# Patient Record
Sex: MALE | Race: BLACK OR AFRICAN AMERICAN | Employment: OTHER | URBAN - METROPOLITAN AREA
[De-identification: names, ages, dates, MRNs, and addresses within clinical notes are randomized per-mention and may not be internally consistent; named-entity substitution may affect disease eponyms.]

---

## 2018-01-01 ENCOUNTER — APPOINTMENT (OUTPATIENT)
Dept: GENERAL RADIOLOGY | Facility: HOSPITAL | Age: 58
DRG: 207 | End: 2018-01-01
Attending: INTERNAL MEDICINE
Payer: MEDICARE

## 2018-01-01 ENCOUNTER — APPOINTMENT (OUTPATIENT)
Dept: GENERAL RADIOLOGY | Facility: HOSPITAL | Age: 58
DRG: 207 | End: 2018-01-01
Attending: HOSPITALIST
Payer: MEDICARE

## 2018-01-01 ENCOUNTER — APPOINTMENT (OUTPATIENT)
Dept: CT IMAGING | Facility: HOSPITAL | Age: 58
DRG: 207 | End: 2018-01-01
Attending: STUDENT IN AN ORGANIZED HEALTH CARE EDUCATION/TRAINING PROGRAM
Payer: MEDICARE

## 2018-01-01 ENCOUNTER — APPOINTMENT (OUTPATIENT)
Dept: CT IMAGING | Facility: HOSPITAL | Age: 58
DRG: 166 | End: 2018-01-01
Attending: INTERNAL MEDICINE
Payer: MEDICARE

## 2018-01-01 ENCOUNTER — APPOINTMENT (OUTPATIENT)
Dept: ULTRASOUND IMAGING | Facility: HOSPITAL | Age: 58
DRG: 207 | End: 2018-01-01
Attending: INTERNAL MEDICINE
Payer: MEDICARE

## 2018-01-01 ENCOUNTER — HOSPITAL ENCOUNTER (INPATIENT)
Facility: HOSPITAL | Age: 58
LOS: 11 days | Discharge: NURSING, REHAB OR LONG-TERM CARE | DRG: 207 | End: 2018-01-01
Attending: STUDENT IN AN ORGANIZED HEALTH CARE EDUCATION/TRAINING PROGRAM | Admitting: HOSPITALIST
Payer: MEDICARE

## 2018-01-01 ENCOUNTER — APPOINTMENT (OUTPATIENT)
Dept: CT IMAGING | Facility: HOSPITAL | Age: 58
DRG: 166 | End: 2018-01-01
Attending: EMERGENCY MEDICINE
Payer: MEDICARE

## 2018-01-01 ENCOUNTER — HOSPITAL ENCOUNTER (INPATIENT)
Facility: HOSPITAL | Age: 58
LOS: 1 days | DRG: 177 | End: 2018-01-01
Attending: HOSPITALIST | Admitting: HOSPITALIST
Payer: OTHER MISCELLANEOUS

## 2018-01-01 ENCOUNTER — APPOINTMENT (OUTPATIENT)
Dept: INTERVENTIONAL RADIOLOGY/VASCULAR | Facility: HOSPITAL | Age: 58
DRG: 166 | End: 2018-01-01
Attending: INTERNAL MEDICINE
Payer: MEDICARE

## 2018-01-01 ENCOUNTER — APPOINTMENT (OUTPATIENT)
Dept: GENERAL RADIOLOGY | Facility: HOSPITAL | Age: 58
DRG: 166 | End: 2018-01-01
Attending: INTERNAL MEDICINE
Payer: MEDICARE

## 2018-01-01 ENCOUNTER — APPOINTMENT (OUTPATIENT)
Dept: MRI IMAGING | Facility: HOSPITAL | Age: 58
DRG: 166 | End: 2018-01-01
Attending: Other
Payer: MEDICARE

## 2018-01-01 ENCOUNTER — APPOINTMENT (OUTPATIENT)
Dept: ULTRASOUND IMAGING | Facility: HOSPITAL | Age: 58
DRG: 166 | End: 2018-01-01
Attending: INTERNAL MEDICINE
Payer: MEDICARE

## 2018-01-01 ENCOUNTER — HOSPITAL ENCOUNTER (EMERGENCY)
Facility: HOSPITAL | Age: 58
Discharge: HOME | End: 2018-01-01
Attending: EMERGENCY MEDICINE
Payer: MEDICARE

## 2018-01-01 ENCOUNTER — APPOINTMENT (OUTPATIENT)
Dept: GENERAL RADIOLOGY | Facility: HOSPITAL | Age: 58
DRG: 207 | End: 2018-01-01
Attending: STUDENT IN AN ORGANIZED HEALTH CARE EDUCATION/TRAINING PROGRAM
Payer: MEDICARE

## 2018-01-01 ENCOUNTER — APPOINTMENT (OUTPATIENT)
Dept: GENERAL RADIOLOGY | Facility: HOSPITAL | Age: 58
DRG: 166 | End: 2018-01-01
Attending: HOSPITALIST
Payer: MEDICARE

## 2018-01-01 ENCOUNTER — APPOINTMENT (OUTPATIENT)
Dept: GENERAL RADIOLOGY | Facility: HOSPITAL | Age: 58
DRG: 166 | End: 2018-01-01
Attending: EMERGENCY MEDICINE
Payer: MEDICARE

## 2018-01-01 ENCOUNTER — APPOINTMENT (OUTPATIENT)
Dept: GENERAL RADIOLOGY | Facility: HOSPITAL | Age: 58
DRG: 166 | End: 2018-01-01
Attending: NURSE PRACTITIONER
Payer: MEDICARE

## 2018-01-01 ENCOUNTER — HOSPITAL ENCOUNTER (INPATIENT)
Facility: HOSPITAL | Age: 58
LOS: 12 days | Discharge: HOSPICE | DRG: 166 | End: 2018-01-01
Attending: EMERGENCY MEDICINE | Admitting: INTERNAL MEDICINE
Payer: MEDICARE

## 2018-01-01 ENCOUNTER — APPOINTMENT (OUTPATIENT)
Dept: CT IMAGING | Facility: HOSPITAL | Age: 58
DRG: 207 | End: 2018-01-01
Attending: INTERNAL MEDICINE
Payer: MEDICARE

## 2018-01-01 ENCOUNTER — LAB REQUISITION (OUTPATIENT)
Dept: LAB | Facility: HOSPITAL | Age: 58
End: 2018-01-01
Attending: INTERNAL MEDICINE
Payer: MEDICARE

## 2018-01-01 ENCOUNTER — APPOINTMENT (OUTPATIENT)
Dept: CV DIAGNOSTICS | Facility: HOSPITAL | Age: 58
DRG: 207 | End: 2018-01-01
Attending: HOSPITALIST
Payer: MEDICARE

## 2018-01-01 VITALS
HEIGHT: 68 IN | SYSTOLIC BLOOD PRESSURE: 134 MMHG | RESPIRATION RATE: 17 BRPM | BODY MASS INDEX: 20.92 KG/M2 | TEMPERATURE: 99 F | WEIGHT: 138 LBS | DIASTOLIC BLOOD PRESSURE: 62 MMHG | OXYGEN SATURATION: 100 % | HEART RATE: 66 BPM

## 2018-01-01 VITALS
BODY MASS INDEX: 17.45 KG/M2 | SYSTOLIC BLOOD PRESSURE: 126 MMHG | HEIGHT: 74 IN | OXYGEN SATURATION: 96 % | DIASTOLIC BLOOD PRESSURE: 76 MMHG | HEART RATE: 89 BPM | TEMPERATURE: 98 F | WEIGHT: 136 LBS | RESPIRATION RATE: 19 BRPM

## 2018-01-01 VITALS
HEIGHT: 74 IN | WEIGHT: 158.75 LBS | OXYGEN SATURATION: 90 % | HEART RATE: 92 BPM | BODY MASS INDEX: 20.37 KG/M2 | RESPIRATION RATE: 19 BRPM | DIASTOLIC BLOOD PRESSURE: 75 MMHG | TEMPERATURE: 98 F | SYSTOLIC BLOOD PRESSURE: 142 MMHG

## 2018-01-01 VITALS
OXYGEN SATURATION: 90 % | TEMPERATURE: 96 F | DIASTOLIC BLOOD PRESSURE: 50 MMHG | WEIGHT: 138.5 LBS | RESPIRATION RATE: 10 BRPM | HEART RATE: 60 BPM | SYSTOLIC BLOOD PRESSURE: 81 MMHG | BODY MASS INDEX: 21 KG/M2

## 2018-01-01 DIAGNOSIS — J44.9 CHRONIC OBSTRUCTIVE PULMONARY DISEASE, UNSPECIFIED COPD TYPE (HCC): ICD-10-CM

## 2018-01-01 DIAGNOSIS — I82.622 DEEP VEIN THROMBOSIS (DVT) OF LEFT UPPER EXTREMITY, UNSPECIFIED CHRONICITY, UNSPECIFIED VEIN (HCC): ICD-10-CM

## 2018-01-01 DIAGNOSIS — I51.9 HEART DISEASE: ICD-10-CM

## 2018-01-01 DIAGNOSIS — R94.31 ABNORMAL EKG: ICD-10-CM

## 2018-01-01 DIAGNOSIS — N18.9 CHRONIC RENAL FAILURE, UNSPECIFIED CKD STAGE: ICD-10-CM

## 2018-01-01 DIAGNOSIS — F32.89 OTHER SPECIFIED DEPRESSIVE EPISODES: ICD-10-CM

## 2018-01-01 DIAGNOSIS — R77.8 ELEVATED TROPONIN: ICD-10-CM

## 2018-01-01 DIAGNOSIS — S09.90XA INJURY OF HEAD, INITIAL ENCOUNTER: ICD-10-CM

## 2018-01-01 DIAGNOSIS — J18.9 PNEUMONIA OF BOTH UPPER LOBES DUE TO INFECTIOUS ORGANISM: ICD-10-CM

## 2018-01-01 DIAGNOSIS — T82.848A PAIN FROM A/V FISTULA (HCC): Primary | ICD-10-CM

## 2018-01-01 DIAGNOSIS — G93.40 ENCEPHALOPATHY ACUTE: Primary | ICD-10-CM

## 2018-01-01 DIAGNOSIS — E16.2 HYPOGLYCEMIA: ICD-10-CM

## 2018-01-01 DIAGNOSIS — I50.9 ACUTE ON CHRONIC CONGESTIVE HEART FAILURE, UNSPECIFIED HEART FAILURE TYPE (HCC): ICD-10-CM

## 2018-01-01 DIAGNOSIS — R40.0 SOMNOLENCE: ICD-10-CM
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
## 2018-01-01 LAB
ALBUMIN SERPL-MCNC: 2.3 G/DL (ref 3.5–4.8)
ALBUMIN SERPL-MCNC: 2.9 G/DL (ref 3.5–4.8)
ALBUMIN SERPL-MCNC: 3 G/DL (ref 3.5–4.8)
ALBUMIN/GLOB SERPL: 0.7 {RATIO} (ref 1–2)
ALBUMIN/GLOB SERPL: 0.8 {RATIO} (ref 1–2)
ALBUMIN/GLOB SERPL: 0.9 {RATIO} (ref 1–2)
ALLENS TEST: POSITIVE
ALP LIVER SERPL-CCNC: 100 U/L (ref 45–117)
ALP LIVER SERPL-CCNC: 220 U/L (ref 45–117)
ALP LIVER SERPL-CCNC: 92 U/L (ref 45–117)
ALT SERPL-CCNC: 15 U/L (ref 17–63)
ALT SERPL-CCNC: 26 U/L (ref 17–63)
ALT SERPL-CCNC: 55 U/L (ref 17–63)
ANION GAP SERPL CALC-SCNC: 10 MMOL/L (ref 0–18)
ANION GAP SERPL CALC-SCNC: 11 MMOL/L (ref 0–18)
ANION GAP SERPL CALC-SCNC: 11 MMOL/L (ref 0–18)
ANION GAP SERPL CALC-SCNC: 12 MMOL/L (ref 0–18)
ANION GAP SERPL CALC-SCNC: 12 MMOL/L (ref 0–18)
ANION GAP SERPL CALC-SCNC: 13 MMOL/L (ref 0–18)
ANION GAP SERPL CALC-SCNC: 15 MMOL/L (ref 0–18)
ANION GAP SERPL CALC-SCNC: 15 MMOL/L (ref 0–18)
ANION GAP SERPL CALC-SCNC: 16 MMOL/L (ref 0–18)
ANION GAP SERPL CALC-SCNC: 9 MMOL/L (ref 0–18)
APTT PPP: 131.5 SECONDS (ref 26.1–34.6)
APTT PPP: 28.7 SECONDS (ref 26.1–34.6)
APTT PPP: 31.4 SECONDS (ref 26.1–34.6)
APTT PPP: 37.5 SECONDS (ref 26.1–34.6)
APTT PPP: 47.3 SECONDS (ref 26.1–34.6)
APTT PPP: 56.2 SECONDS (ref 26.1–34.6)
APTT PPP: 60.2 SECONDS (ref 26.1–34.6)
APTT PPP: 64.3 SECONDS (ref 26.1–34.6)
APTT PPP: 67.2 SECONDS (ref 26.1–34.6)
APTT PPP: 69.7 SECONDS (ref 26.1–34.6)
APTT PPP: 73.5 SECONDS (ref 26.1–34.6)
APTT PPP: 76.8 SECONDS (ref 26.1–34.6)
APTT PPP: 79.5 SECONDS (ref 26.1–34.6)
APTT PPP: 91.5 SECONDS (ref 26.1–34.6)
APTT PPP: 97.4 SECONDS (ref 26.1–34.6)
ARTERIAL BLD GAS O2 SATURATION: 72 % (ref 92–100)
ARTERIAL BLD GAS O2 SATURATION: 96 % (ref 92–100)
ARTERIAL BLD GAS O2 SATURATION: 98 % (ref 92–100)
ARTERIAL BLOOD GAS BASE EXCESS: -5.5
ARTERIAL BLOOD GAS BASE EXCESS: -6.4
ARTERIAL BLOOD GAS BASE EXCESS: -6.5
ARTERIAL BLOOD GAS HCO3: 20.5 MEQ/L (ref 22–26)
ARTERIAL BLOOD GAS HCO3: 21.3 MEQ/L (ref 22–26)
ARTERIAL BLOOD GAS HCO3: 24.8 MEQ/L (ref 22–26)
ARTERIAL BLOOD GAS PCO2: 42 MM HG (ref 35–45)
ARTERIAL BLOOD GAS PCO2: 51 MM HG (ref 35–45)
ARTERIAL BLOOD GAS PCO2: 90 MM HG (ref 35–45)
ARTERIAL BLOOD GAS PH: 7.06 (ref 7.35–7.45)
ARTERIAL BLOOD GAS PH: 7.23 (ref 7.35–7.45)
ARTERIAL BLOOD GAS PH: 7.31 (ref 7.35–7.45)
ARTERIAL BLOOD GAS PO2: 114 MM HG (ref 80–105)
ARTERIAL BLOOD GAS PO2: 284 MM HG (ref 80–105)
ARTERIAL BLOOD GAS PO2: 55 MM HG (ref 80–105)
AST SERPL-CCNC: 14 U/L (ref 15–41)
AST SERPL-CCNC: 17 U/L (ref 15–41)
AST SERPL-CCNC: 34 U/L (ref 15–41)
ATRIAL RATE: 100 BPM
ATRIAL RATE: 101 BPM
ATRIAL RATE: 79 BPM
ATRIAL RATE: 79 BPM
ATRIAL RATE: 83 BPM
BASOPHILS # BLD AUTO: 0 X10(3) UL (ref 0–0.1)
BASOPHILS # BLD AUTO: 0.01 X10(3) UL (ref 0–0.1)
BASOPHILS # BLD AUTO: 0.03 X10(3) UL (ref 0–0.1)
BASOPHILS NFR BLD AUTO: 0 %
BASOPHILS NFR BLD AUTO: 0.1 %
BASOPHILS NFR BLD AUTO: 0.6 %
BILIRUB SERPL-MCNC: 0.3 MG/DL (ref 0.1–2)
BILIRUB SERPL-MCNC: 0.3 MG/DL (ref 0.1–2)
BILIRUB SERPL-MCNC: 0.5 MG/DL (ref 0.1–2)
BUN BLD-MCNC: 129 MG/DL (ref 8–20)
BUN BLD-MCNC: 27 MG/DL (ref 8–20)
BUN BLD-MCNC: 34 MG/DL (ref 8–20)
BUN BLD-MCNC: 36 MG/DL (ref 8–20)
BUN BLD-MCNC: 42 MG/DL (ref 8–20)
BUN BLD-MCNC: 44 MG/DL (ref 8–20)
BUN BLD-MCNC: 56 MG/DL (ref 8–20)
BUN BLD-MCNC: 81 MG/DL (ref 8–20)
BUN BLD-MCNC: 83 MG/DL (ref 8–20)
BUN BLD-MCNC: 85 MG/DL (ref 8–20)
BUN BLD-MCNC: 91 MG/DL (ref 8–20)
BUN BLD-MCNC: 92 MG/DL (ref 8–20)
BUN/CREAT SERPL: 10.8 (ref 10–20)
BUN/CREAT SERPL: 11 (ref 10–20)
BUN/CREAT SERPL: 11 (ref 10–20)
BUN/CREAT SERPL: 14.3 (ref 10–20)
BUN/CREAT SERPL: 6.8 (ref 10–20)
BUN/CREAT SERPL: 7.3 (ref 10–20)
BUN/CREAT SERPL: 7.5 (ref 10–20)
BUN/CREAT SERPL: 7.5 (ref 10–20)
BUN/CREAT SERPL: 7.8 (ref 10–20)
BUN/CREAT SERPL: 8.9 (ref 10–20)
CALCIUM BLD-MCNC: 8.8 MG/DL (ref 8.3–10.3)
CALCIUM BLD-MCNC: 8.8 MG/DL (ref 8.3–10.3)
CALCIUM BLD-MCNC: 8.9 MG/DL (ref 8.3–10.3)
CALCIUM BLD-MCNC: 9 MG/DL (ref 8.3–10.3)
CALCIUM BLD-MCNC: 9 MG/DL (ref 8.3–10.3)
CALCIUM BLD-MCNC: 9.1 MG/DL (ref 8.3–10.3)
CALCIUM BLD-MCNC: 9.3 MG/DL (ref 8.3–10.3)
CALCULATED O2 SATURATION: 100 % (ref 92–100)
CALCULATED O2 SATURATION: 71 % (ref 92–100)
CALCULATED O2 SATURATION: 98 % (ref 92–100)
CARBOXYHEMOGLOBIN: 1.3 % SAT (ref 0–3)
CARBOXYHEMOGLOBIN: 1.5 % SAT (ref 0–3)
CARBOXYHEMOGLOBIN: 1.6 % SAT (ref 0–3)
CHLORIDE SERPL-SCNC: 100 MMOL/L (ref 101–111)
CHLORIDE SERPL-SCNC: 100 MMOL/L (ref 101–111)
CHLORIDE SERPL-SCNC: 101 MMOL/L (ref 101–111)
CHLORIDE SERPL-SCNC: 102 MMOL/L (ref 101–111)
CHLORIDE SERPL-SCNC: 102 MMOL/L (ref 101–111)
CHLORIDE SERPL-SCNC: 103 MMOL/L (ref 101–111)
CHLORIDE SERPL-SCNC: 103 MMOL/L (ref 101–111)
CHLORIDE SERPL-SCNC: 104 MMOL/L (ref 101–111)
CHLORIDE SERPL-SCNC: 105 MMOL/L (ref 101–111)
CHLORIDE SERPL-SCNC: 99 MMOL/L (ref 101–111)
CHOLEST SMN-MCNC: 120 MG/DL (ref ?–200)
CO2 SERPL-SCNC: 21 MMOL/L (ref 22–32)
CO2 SERPL-SCNC: 22 MMOL/L (ref 22–32)
CO2 SERPL-SCNC: 22 MMOL/L (ref 22–32)
CO2 SERPL-SCNC: 23 MMOL/L (ref 22–32)
CO2 SERPL-SCNC: 23 MMOL/L (ref 22–32)
CO2 SERPL-SCNC: 24 MMOL/L (ref 22–32)
CO2 SERPL-SCNC: 25 MMOL/L (ref 22–32)
CO2 SERPL-SCNC: 26 MMOL/L (ref 22–32)
CO2 SERPL-SCNC: 26 MMOL/L (ref 22–32)
CO2 SERPL-SCNC: 27 MMOL/L (ref 22–32)
CO2 SERPL-SCNC: 28 MMOL/L (ref 22–32)
CO2 SERPL-SCNC: 28 MMOL/L (ref 22–32)
CORTIS SERPL-MCNC: 21.4 UG/DL
CREAT BLD-MCNC: 3.99 MG/DL (ref 0.7–1.3)
CREAT BLD-MCNC: 4.67 MG/DL (ref 0.7–1.3)
CREAT BLD-MCNC: 4.78 MG/DL (ref 0.7–1.3)
CREAT BLD-MCNC: 5.58 MG/DL (ref 0.7–1.3)
CREAT BLD-MCNC: 5.62 MG/DL (ref 0.7–1.3)
CREAT BLD-MCNC: 6.29 MG/DL (ref 0.7–1.3)
CREAT BLD-MCNC: 7.35 MG/DL (ref 0.7–1.3)
CREAT BLD-MCNC: 7.69 MG/DL (ref 0.7–1.3)
CREAT BLD-MCNC: 7.9 MG/DL (ref 0.7–1.3)
CREAT BLD-MCNC: 8.34 MG/DL (ref 0.7–1.3)
CREAT BLD-MCNC: 8.42 MG/DL (ref 0.7–1.3)
CREAT BLD-MCNC: 9.04 MG/DL (ref 0.7–1.3)
EOSINOPHIL # BLD AUTO: 0 X10(3) UL (ref 0–0.3)
EOSINOPHIL # BLD AUTO: 0 X10(3) UL (ref 0–0.3)
EOSINOPHIL # BLD AUTO: 0.01 X10(3) UL (ref 0–0.3)
EOSINOPHIL # BLD AUTO: 0.03 X10(3) UL (ref 0–0.3)
EOSINOPHIL # BLD AUTO: 0.03 X10(3) UL (ref 0–0.3)
EOSINOPHIL # BLD AUTO: 0.08 X10(3) UL (ref 0–0.3)
EOSINOPHIL # BLD AUTO: 0.1 X10(3) UL (ref 0–0.3)
EOSINOPHIL # BLD AUTO: 0.11 X10(3) UL (ref 0–0.3)
EOSINOPHIL # BLD AUTO: 0.14 X10(3) UL (ref 0–0.3)
EOSINOPHIL NFR BLD AUTO: 0 %
EOSINOPHIL NFR BLD AUTO: 0 %
EOSINOPHIL NFR BLD AUTO: 0.1 %
EOSINOPHIL NFR BLD AUTO: 0.2 %
EOSINOPHIL NFR BLD AUTO: 0.2 %
EOSINOPHIL NFR BLD AUTO: 1 %
EOSINOPHIL NFR BLD AUTO: 1.3 %
EOSINOPHIL NFR BLD AUTO: 1.5 %
EOSINOPHIL NFR BLD AUTO: 2.1 %
ERYTHROCYTE [DISTWIDTH] IN BLOOD BY AUTOMATED COUNT: 16.6 % (ref 11.5–16)
ERYTHROCYTE [DISTWIDTH] IN BLOOD BY AUTOMATED COUNT: 17 % (ref 11.5–16)
ERYTHROCYTE [DISTWIDTH] IN BLOOD BY AUTOMATED COUNT: 17.6 % (ref 11.5–16)
ERYTHROCYTE [DISTWIDTH] IN BLOOD BY AUTOMATED COUNT: 17.7 % (ref 11.5–16)
ERYTHROCYTE [DISTWIDTH] IN BLOOD BY AUTOMATED COUNT: 17.8 % (ref 11.5–16)
ERYTHROCYTE [DISTWIDTH] IN BLOOD BY AUTOMATED COUNT: 17.9 % (ref 11.5–16)
ERYTHROCYTE [DISTWIDTH] IN BLOOD BY AUTOMATED COUNT: 18 % (ref 11.5–16)
ERYTHROCYTE [DISTWIDTH] IN BLOOD BY AUTOMATED COUNT: 18.1 % (ref 11.5–16)
ERYTHROCYTE [DISTWIDTH] IN BLOOD BY AUTOMATED COUNT: 18.3 % (ref 11.5–16)
ERYTHROCYTE [DISTWIDTH] IN BLOOD BY AUTOMATED COUNT: 18.4 % (ref 11.5–16)
ERYTHROCYTE [DISTWIDTH] IN BLOOD BY AUTOMATED COUNT: 18.4 % (ref 11.5–16)
ERYTHROCYTE [DISTWIDTH] IN BLOOD BY AUTOMATED COUNT: 18.6 % (ref 11.5–16)
FIBRINOGEN: 634 MG/DL (ref 200–446)
FIO2: 100 %
FIO2: 60 %
GLOBULIN PLAS-MCNC: 3.3 G/DL (ref 2.5–4)
GLOBULIN PLAS-MCNC: 3.3 G/DL (ref 2.5–4)
GLOBULIN PLAS-MCNC: 4 G/DL (ref 2.5–3.7)
GLUCOSE BLD-MCNC: 100 MG/DL (ref 65–99)
GLUCOSE BLD-MCNC: 101 MG/DL (ref 65–99)
GLUCOSE BLD-MCNC: 102 MG/DL (ref 65–99)
GLUCOSE BLD-MCNC: 103 MG/DL (ref 65–99)
GLUCOSE BLD-MCNC: 110 MG/DL (ref 70–99)
GLUCOSE BLD-MCNC: 111 MG/DL (ref 65–99)
GLUCOSE BLD-MCNC: 116 MG/DL (ref 65–99)
GLUCOSE BLD-MCNC: 129 MG/DL (ref 70–99)
GLUCOSE BLD-MCNC: 136 MG/DL (ref 70–99)
GLUCOSE BLD-MCNC: 137 MG/DL (ref 65–99)
GLUCOSE BLD-MCNC: 142 MG/DL (ref 65–99)
GLUCOSE BLD-MCNC: 146 MG/DL (ref 65–99)
GLUCOSE BLD-MCNC: 160 MG/DL (ref 65–99)
GLUCOSE BLD-MCNC: 56 MG/DL (ref 65–99)
GLUCOSE BLD-MCNC: 70 MG/DL (ref 65–99)
GLUCOSE BLD-MCNC: 75 MG/DL (ref 65–99)
GLUCOSE BLD-MCNC: 78 MG/DL (ref 65–99)
GLUCOSE BLD-MCNC: 80 MG/DL (ref 65–99)
GLUCOSE BLD-MCNC: 80 MG/DL (ref 65–99)
GLUCOSE BLD-MCNC: 80 MG/DL (ref 70–99)
GLUCOSE BLD-MCNC: 80 MG/DL (ref 70–99)
GLUCOSE BLD-MCNC: 83 MG/DL (ref 70–99)
GLUCOSE BLD-MCNC: 84 MG/DL (ref 70–99)
GLUCOSE BLD-MCNC: 84 MG/DL (ref 70–99)
GLUCOSE BLD-MCNC: 85 MG/DL (ref 65–99)
GLUCOSE BLD-MCNC: 85 MG/DL (ref 65–99)
GLUCOSE BLD-MCNC: 85 MG/DL (ref 70–99)
GLUCOSE BLD-MCNC: 86 MG/DL (ref 65–99)
GLUCOSE BLD-MCNC: 86 MG/DL (ref 70–99)
GLUCOSE BLD-MCNC: 87 MG/DL (ref 65–99)
GLUCOSE BLD-MCNC: 87 MG/DL (ref 70–99)
GLUCOSE BLD-MCNC: 91 MG/DL (ref 65–99)
GLUCOSE BLD-MCNC: 92 MG/DL (ref 65–99)
GLUCOSE BLD-MCNC: 92 MG/DL (ref 70–99)
GLUCOSE BLD-MCNC: 93 MG/DL (ref 65–99)
GLUCOSE BLD-MCNC: 97 MG/DL (ref 65–99)
HAV IGM SER QL: 2.1 MG/DL (ref 1.8–2.5)
HAV IGM SER QL: 2.2 MG/DL (ref 1.8–2.5)
HAV IGM SER QL: 2.3 MG/DL (ref 1.8–2.5)
HAV IGM SER QL: 2.5 MG/DL (ref 1.8–2.5)
HAV IGM SER QL: 2.8 MG/DL (ref 1.8–2.5)
HBV SURFACE AG SER-ACNC: <0.1 [IU]/L
HBV SURFACE AG SERPL QL IA: NONREACTIVE
HCT VFR BLD AUTO: 27.9 % (ref 37–53)
HCT VFR BLD AUTO: 28 % (ref 37–53)
HCT VFR BLD AUTO: 28 % (ref 37–53)
HCT VFR BLD AUTO: 28.3 % (ref 37–53)
HCT VFR BLD AUTO: 29 % (ref 37–53)
HCT VFR BLD AUTO: 29.1 % (ref 37–53)
HCT VFR BLD AUTO: 29.4 % (ref 37–53)
HCT VFR BLD AUTO: 30.1 % (ref 37–53)
HCT VFR BLD AUTO: 30.2 % (ref 37–53)
HCT VFR BLD AUTO: 31.4 % (ref 37–53)
HCT VFR BLD AUTO: 31.8 % (ref 37–53)
HCT VFR BLD AUTO: 32.2 % (ref 37–53)
HDLC SERPL-MCNC: 60 MG/DL (ref 40–59)
HGB BLD-MCNC: 8.6 G/DL (ref 13–17)
HGB BLD-MCNC: 8.8 G/DL (ref 13–17)
HGB BLD-MCNC: 8.8 G/DL (ref 13–17)
HGB BLD-MCNC: 9 G/DL (ref 13–17)
HGB BLD-MCNC: 9.1 G/DL (ref 13–17)
HGB BLD-MCNC: 9.2 G/DL (ref 13–17)
HGB BLD-MCNC: 9.2 G/DL (ref 13–17)
HGB BLD-MCNC: 9.3 G/DL (ref 13–17)
HGB BLD-MCNC: 9.5 G/DL (ref 13–17)
HGB BLD-MCNC: 9.7 G/DL (ref 13–17)
IMMATURE GRANULOCYTE COUNT: 0.02 X10(3) UL (ref 0–1)
IMMATURE GRANULOCYTE COUNT: 0.03 X10(3) UL (ref 0–1)
IMMATURE GRANULOCYTE COUNT: 0.03 X10(3) UL (ref 0–1)
IMMATURE GRANULOCYTE COUNT: 0.05 X10(3) UL (ref 0–1)
IMMATURE GRANULOCYTE COUNT: 0.06 X10(3) UL (ref 0–1)
IMMATURE GRANULOCYTE COUNT: 0.08 X10(3) UL (ref 0–1)
IMMATURE GRANULOCYTE COUNT: 0.12 X10(3) UL (ref 0–1)
IMMATURE GRANULOCYTE COUNT: 0.16 X10(3) UL (ref 0–1)
IMMATURE GRANULOCYTE COUNT: 0.17 X10(3) UL (ref 0–1)
IMMATURE GRANULOCYTE RATIO %: 0.4 %
IMMATURE GRANULOCYTE RATIO %: 0.4 %
IMMATURE GRANULOCYTE RATIO %: 0.6 %
IMMATURE GRANULOCYTE RATIO %: 1 %
IMMATURE GRANULOCYTE RATIO %: 1.2 %
IMMATURE GRANULOCYTE RATIO %: 1.3 %
INR BLD: 1.1 (ref 0.9–1.1)
INR BLD: 1.3 (ref 0.9–1.1)
INR BLD: 1.7 (ref 0.9–1.1)
INR BLD: 1.88 (ref 0.9–1.1)
INR BLD: 2.25 (ref 0.9–1.1)
INR BLD: 2.72 (ref 0.9–1.1)
INR BLD: 3.15 (ref 0.9–1.1)
INR BLD: 3.9 (ref 0.9–1.1)
INR BLD: 4.33 (ref 0.9–1.1)
INR BLD: 5.39 (ref 0.9–1.1)
INR BLD: 7.41 (ref 0.9–1.1)
IONIZED CALCIUM: 1.15 MMOL/L (ref 1.12–1.32)
IONIZED CALCIUM: 1.24 MMOL/L (ref 1.12–1.32)
IONIZED CALCIUM: 1.3 MMOL/L (ref 1.12–1.32)
LACTIC ACID ARTERIAL: 2.4 MMOL/L (ref 0.5–2)
LACTIC ACID ARTERIAL: <1.3 MMOL/L (ref 0.5–2)
LACTIC ACID ARTERIAL: <1.3 MMOL/L (ref 0.5–2)
LDLC SERPL CALC-MCNC: 20 MG/DL (ref ?–100)
LYMPHOCYTES # BLD AUTO: 0.23 X10(3) UL (ref 0.9–4)
LYMPHOCYTES # BLD AUTO: 0.34 X10(3) UL (ref 0.9–4)
LYMPHOCYTES # BLD AUTO: 0.36 X10(3) UL (ref 0.9–4)
LYMPHOCYTES # BLD AUTO: 0.39 X10(3) UL (ref 0.9–4)
LYMPHOCYTES # BLD AUTO: 0.47 X10(3) UL (ref 0.9–4)
LYMPHOCYTES # BLD AUTO: 0.59 X10(3) UL (ref 0.9–4)
LYMPHOCYTES # BLD AUTO: 0.6 X10(3) UL (ref 0.9–4)
LYMPHOCYTES # BLD AUTO: 0.67 X10(3) UL (ref 0.9–4)
LYMPHOCYTES # BLD AUTO: 0.68 X10(3) UL (ref 0.9–4)
LYMPHOCYTES NFR BLD AUTO: 1.4 %
LYMPHOCYTES NFR BLD AUTO: 14.3 %
LYMPHOCYTES NFR BLD AUTO: 3.6 %
LYMPHOCYTES NFR BLD AUTO: 3.7 %
LYMPHOCYTES NFR BLD AUTO: 4.2 %
LYMPHOCYTES NFR BLD AUTO: 4.7 %
LYMPHOCYTES NFR BLD AUTO: 6 %
LYMPHOCYTES NFR BLD AUTO: 7.8 %
LYMPHOCYTES NFR BLD AUTO: 9.2 %
M PROTEIN MFR SERPL ELPH: 5.6 G/DL (ref 6.1–8.3)
M PROTEIN MFR SERPL ELPH: 6.2 G/DL (ref 6.1–8.3)
M PROTEIN MFR SERPL ELPH: 7 G/DL (ref 6.1–8.3)
MCH RBC QN AUTO: 29.7 PG (ref 27–33.2)
MCH RBC QN AUTO: 29.7 PG (ref 27–33.2)
MCH RBC QN AUTO: 29.9 PG (ref 27–33.2)
MCH RBC QN AUTO: 30.2 PG (ref 27–33.2)
MCH RBC QN AUTO: 30.2 PG (ref 27–33.2)
MCH RBC QN AUTO: 30.4 PG (ref 27–33.2)
MCH RBC QN AUTO: 30.7 PG (ref 27–33.2)
MCH RBC QN AUTO: 30.7 PG (ref 27–33.2)
MCH RBC QN AUTO: 30.8 PG (ref 27–33.2)
MCH RBC QN AUTO: 30.8 PG (ref 27–33.2)
MCH RBC QN AUTO: 30.9 PG (ref 27–33.2)
MCH RBC QN AUTO: 30.9 PG (ref 27–33.2)
MCHC RBC AUTO-ENTMCNC: 29.9 G/DL (ref 31–37)
MCHC RBC AUTO-ENTMCNC: 30.1 G/DL (ref 31–37)
MCHC RBC AUTO-ENTMCNC: 30.1 G/DL (ref 31–37)
MCHC RBC AUTO-ENTMCNC: 30.3 G/DL (ref 31–37)
MCHC RBC AUTO-ENTMCNC: 30.3 G/DL (ref 31–37)
MCHC RBC AUTO-ENTMCNC: 30.4 G/DL (ref 31–37)
MCHC RBC AUTO-ENTMCNC: 30.9 G/DL (ref 31–37)
MCHC RBC AUTO-ENTMCNC: 31.3 G/DL (ref 31–37)
MCHC RBC AUTO-ENTMCNC: 31.5 G/DL (ref 31–37)
MCHC RBC AUTO-ENTMCNC: 32.1 G/DL (ref 31–37)
MCHC RBC AUTO-ENTMCNC: 32.6 G/DL (ref 31–37)
MCHC RBC AUTO-ENTMCNC: 32.9 G/DL (ref 31–37)
MCV RBC AUTO: 101.9 FL (ref 80–99)
MCV RBC AUTO: 103.2 FL (ref 80–99)
MCV RBC AUTO: 93 FL (ref 80–99)
MCV RBC AUTO: 94 FL (ref 80–99)
MCV RBC AUTO: 94.2 FL (ref 80–99)
MCV RBC AUTO: 96.5 FL (ref 80–99)
MCV RBC AUTO: 98 FL (ref 80–99)
MCV RBC AUTO: 98.7 FL (ref 80–99)
MCV RBC AUTO: 98.7 FL (ref 80–99)
MCV RBC AUTO: 99.3 FL (ref 80–99)
MCV RBC AUTO: 99.7 FL (ref 80–99)
MCV RBC AUTO: 99.7 FL (ref 80–99)
METHEMOGLOBIN: 0.5 % SAT (ref 0.4–1.5)
METHEMOGLOBIN: 0.5 % SAT (ref 0.4–1.5)
METHEMOGLOBIN: 0.7 % SAT (ref 0.4–1.5)
MONOCYTES # BLD AUTO: 0.14 X10(3) UL (ref 0.1–1)
MONOCYTES # BLD AUTO: 0.3 X10(3) UL (ref 0.1–1)
MONOCYTES # BLD AUTO: 0.39 X10(3) UL (ref 0.1–1)
MONOCYTES # BLD AUTO: 0.57 X10(3) UL (ref 0.1–1)
MONOCYTES # BLD AUTO: 0.57 X10(3) UL (ref 0.1–1)
MONOCYTES # BLD AUTO: 0.69 X10(3) UL (ref 0.1–1)
MONOCYTES # BLD AUTO: 0.75 X10(3) UL (ref 0.1–1)
MONOCYTES # BLD AUTO: 0.76 X10(3) UL (ref 0.1–1)
MONOCYTES # BLD AUTO: 0.9 X10(3) UL (ref 0.1–1)
MONOCYTES NFR BLD AUTO: 1.5 %
MONOCYTES NFR BLD AUTO: 5.4 %
MONOCYTES NFR BLD AUTO: 5.4 %
MONOCYTES NFR BLD AUTO: 5.7 %
MONOCYTES NFR BLD AUTO: 5.9 %
MONOCYTES NFR BLD AUTO: 6.5 %
MONOCYTES NFR BLD AUTO: 7.7 %
MONOCYTES NFR BLD AUTO: 8.3 %
MONOCYTES NFR BLD AUTO: 8.8 %
NEUTROPHIL ABS PRELIM: 11.25 X10 (3) UL (ref 1.3–6.7)
NEUTROPHIL ABS PRELIM: 12.43 X10 (3) UL (ref 1.3–6.7)
NEUTROPHIL ABS PRELIM: 14.57 X10 (3) UL (ref 1.3–6.7)
NEUTROPHIL ABS PRELIM: 3.46 X10 (3) UL (ref 1.3–6.7)
NEUTROPHIL ABS PRELIM: 3.94 X10 (3) UL (ref 1.3–6.7)
NEUTROPHIL ABS PRELIM: 6 X10 (3) UL (ref 1.3–6.7)
NEUTROPHIL ABS PRELIM: 6.55 X10 (3) UL (ref 1.3–6.7)
NEUTROPHIL ABS PRELIM: 8.81 X10 (3) UL (ref 1.3–6.7)
NEUTROPHIL ABS PRELIM: 9.35 X10 (3) UL (ref 1.3–6.7)
NEUTROPHILS # BLD AUTO: 11.25 X10(3) UL (ref 1.3–6.7)
NEUTROPHILS # BLD AUTO: 12.43 X10(3) UL (ref 1.3–6.7)
NEUTROPHILS # BLD AUTO: 14.57 X10(3) UL (ref 1.3–6.7)
NEUTROPHILS # BLD AUTO: 3.46 X10(3) UL (ref 1.3–6.7)
NEUTROPHILS # BLD AUTO: 3.94 X10(3) UL (ref 1.3–6.7)
NEUTROPHILS # BLD AUTO: 6 X10(3) UL (ref 1.3–6.7)
NEUTROPHILS # BLD AUTO: 6.55 X10(3) UL (ref 1.3–6.7)
NEUTROPHILS # BLD AUTO: 8.81 X10(3) UL (ref 1.3–6.7)
NEUTROPHILS # BLD AUTO: 9.35 X10(3) UL (ref 1.3–6.7)
NEUTROPHILS NFR BLD AUTO: 74.1 %
NEUTROPHILS NFR BLD AUTO: 81.1 %
NEUTROPHILS NFR BLD AUTO: 83.5 %
NEUTROPHILS NFR BLD AUTO: 85.3 %
NEUTROPHILS NFR BLD AUTO: 88.5 %
NEUTROPHILS NFR BLD AUTO: 88.9 %
NEUTROPHILS NFR BLD AUTO: 88.9 %
NEUTROPHILS NFR BLD AUTO: 91.8 %
NEUTROPHILS NFR BLD AUTO: 93.4 %
NONHDLC SERPL-MCNC: 60 MG/DL (ref ?–130)
OSMOLALITY SERPL CALC.SUM OF ELEC: 296 MOSM/KG (ref 275–295)
OSMOLALITY SERPL CALC.SUM OF ELEC: 296 MOSM/KG (ref 275–295)
OSMOLALITY SERPL CALC.SUM OF ELEC: 299 MOSM/KG (ref 275–295)
OSMOLALITY SERPL CALC.SUM OF ELEC: 302 MOSM/KG (ref 275–295)
OSMOLALITY SERPL CALC.SUM OF ELEC: 305 MOSM/KG (ref 275–295)
OSMOLALITY SERPL CALC.SUM OF ELEC: 306 MOSM/KG (ref 275–295)
OSMOLALITY SERPL CALC.SUM OF ELEC: 306 MOSM/KG (ref 275–295)
OSMOLALITY SERPL CALC.SUM OF ELEC: 309 MOSM/KG (ref 275–295)
OSMOLALITY SERPL CALC.SUM OF ELEC: 311 MOSM/KG (ref 275–295)
OSMOLALITY SERPL CALC.SUM OF ELEC: 311 MOSM/KG (ref 275–295)
OSMOLALITY SERPL CALC.SUM OF ELEC: 312 MOSM/KG (ref 275–295)
OSMOLALITY SERPL CALC.SUM OF ELEC: 328 MOSM/KG (ref 275–295)
P AXIS: -11 DEGREES
P AXIS: 60 DEGREES
P AXIS: 61 DEGREES
P AXIS: 63 DEGREES
P AXIS: 72 DEGREES
P-R INTERVAL: 230 MS
P-R INTERVAL: 234 MS
P-R INTERVAL: 244 MS
P-R INTERVAL: 248 MS
P-R INTERVAL: 270 MS
P/F RATIO: 287.7 MMHG
P/F RATIO: 542.1 MMHG
P/F RATIO: 55.3 MMHG
PATIENT TEMPERATURE: 97.3 F
PATIENT TEMPERATURE: 98.6 F
PATIENT TEMPERATURE: 98.6 F
PEEP: 5 CM H2O
PEEP: 5 CM H2O
PHOSPHATE SERPL-MCNC: 7.5 MG/DL (ref 2.5–4.9)
PHOSPHATE SERPL-MCNC: 8.9 MG/DL (ref 2.5–4.9)
PLATELET # BLD AUTO: 125 10(3)UL (ref 150–450)
PLATELET # BLD AUTO: 126 10(3)UL (ref 150–450)
PLATELET # BLD AUTO: 129 10(3)UL (ref 150–450)
PLATELET # BLD AUTO: 130 10(3)UL (ref 150–450)
PLATELET # BLD AUTO: 133 10(3)UL (ref 150–450)
PLATELET # BLD AUTO: 151 10(3)UL (ref 150–450)
PLATELET # BLD AUTO: 164 10(3)UL (ref 150–450)
PLATELET # BLD AUTO: 184 10(3)UL (ref 150–450)
PLATELET # BLD AUTO: 195 10(3)UL (ref 150–450)
PLATELET # BLD AUTO: 197 10(3)UL (ref 150–450)
PLATELET # BLD AUTO: 199 10(3)UL (ref 150–450)
PLATELET # BLD AUTO: 272 10(3)UL (ref 150–450)
PLATELET MORPHOLOGY: NORMAL
PLATELET MORPHOLOGY: NORMAL
POTASSIUM BLOOD GAS: 3.7 MMOL/L (ref 3.6–5.1)
POTASSIUM BLOOD GAS: 4.7 MMOL/L (ref 3.6–5.1)
POTASSIUM BLOOD GAS: 5 MMOL/L (ref 3.6–5.1)
POTASSIUM SERPL-SCNC: 3.6 MMOL/L (ref 3.6–5.1)
POTASSIUM SERPL-SCNC: 3.7 MMOL/L (ref 3.6–5.1)
POTASSIUM SERPL-SCNC: 3.8 MMOL/L (ref 3.6–5.1)
POTASSIUM SERPL-SCNC: 4.1 MMOL/L (ref 3.6–5.1)
POTASSIUM SERPL-SCNC: 4.2 MMOL/L (ref 3.6–5.1)
POTASSIUM SERPL-SCNC: 4.4 MMOL/L (ref 3.6–5.1)
POTASSIUM SERPL-SCNC: 4.4 MMOL/L (ref 3.6–5.1)
POTASSIUM SERPL-SCNC: 4.5 MMOL/L (ref 3.6–5.1)
POTASSIUM SERPL-SCNC: 6.5 MMOL/L (ref 3.6–5.1)
PRO-BETA NATRIURETIC PEPTIDE: ABNORMAL PG/ML (ref ?–125)
PROCALCITONIN SERPL-MCNC: 64.4 NG/ML
PSA SERPL DL<=0.01 NG/ML-MCNC: 14.6 SECONDS (ref 12.4–14.7)
PSA SERPL DL<=0.01 NG/ML-MCNC: 16.7 SECONDS (ref 12.4–14.7)
PSA SERPL DL<=0.01 NG/ML-MCNC: 20.6 SECONDS (ref 12.4–14.7)
PSA SERPL DL<=0.01 NG/ML-MCNC: 22.3 SECONDS (ref 12.4–14.7)
PSA SERPL DL<=0.01 NG/ML-MCNC: 25.6 SECONDS (ref 12.4–14.7)
PSA SERPL DL<=0.01 NG/ML-MCNC: 29.7 SECONDS (ref 12.4–14.7)
PSA SERPL DL<=0.01 NG/ML-MCNC: 33.3 SECONDS (ref 12.4–14.7)
PSA SERPL DL<=0.01 NG/ML-MCNC: 39.4 SECONDS (ref 12.4–14.7)
PSA SERPL DL<=0.01 NG/ML-MCNC: 42.7 SECONDS (ref 12.4–14.7)
PSA SERPL DL<=0.01 NG/ML-MCNC: 50.7 SECONDS (ref 12.4–14.7)
PSA SERPL DL<=0.01 NG/ML-MCNC: 65 SECONDS (ref 12.4–14.7)
Q-T INTERVAL: 366 MS
Q-T INTERVAL: 370 MS
Q-T INTERVAL: 370 MS
Q-T INTERVAL: 382 MS
Q-T INTERVAL: 394 MS
QRS DURATION: 116 MS
QRS DURATION: 118 MS
QRS DURATION: 120 MS
QRS DURATION: 130 MS
QRS DURATION: 134 MS
QTC CALCULATION (BEZET): 424 MS
QTC CALCULATION (BEZET): 448 MS
QTC CALCULATION (BEZET): 451 MS
QTC CALCULATION (BEZET): 472 MS
QTC CALCULATION (BEZET): 479 MS
R AXIS: -18 DEGREES
R AXIS: -31 DEGREES
R AXIS: -5 DEGREES
R AXIS: -9 DEGREES
R AXIS: 87 DEGREES
RBC # BLD AUTO: 2.85 X10(6)UL (ref 4.3–5.7)
RBC # BLD AUTO: 2.89 X10(6)UL (ref 4.3–5.7)
RBC # BLD AUTO: 2.96 X10(6)UL (ref 4.3–5.7)
RBC # BLD AUTO: 2.98 X10(6)UL (ref 4.3–5.7)
RBC # BLD AUTO: 2.98 X10(6)UL (ref 4.3–5.7)
RBC # BLD AUTO: 3.01 X10(6)UL (ref 4.3–5.7)
RBC # BLD AUTO: 3.02 X10(6)UL (ref 4.3–5.7)
RBC # BLD AUTO: 3.06 X10(6)UL (ref 4.3–5.7)
RBC # BLD AUTO: 3.08 X10(6)UL (ref 4.3–5.7)
RBC # BLD AUTO: 3.09 X10(6)UL (ref 4.3–5.7)
RBC # BLD AUTO: 3.15 X10(6)UL (ref 4.3–5.7)
RBC # BLD AUTO: 3.16 X10(6)UL (ref 4.3–5.7)
RED CELL DISTRIBUTION WIDTH-SD: 59.4 FL (ref 35.1–46.3)
RED CELL DISTRIBUTION WIDTH-SD: 61.1 FL (ref 35.1–46.3)
RED CELL DISTRIBUTION WIDTH-SD: 61.2 FL (ref 35.1–46.3)
RED CELL DISTRIBUTION WIDTH-SD: 62.4 FL (ref 35.1–46.3)
RED CELL DISTRIBUTION WIDTH-SD: 62.7 FL (ref 35.1–46.3)
RED CELL DISTRIBUTION WIDTH-SD: 63.5 FL (ref 35.1–46.3)
RED CELL DISTRIBUTION WIDTH-SD: 63.8 FL (ref 35.1–46.3)
RED CELL DISTRIBUTION WIDTH-SD: 64 FL (ref 35.1–46.3)
RED CELL DISTRIBUTION WIDTH-SD: 67 FL (ref 35.1–46.3)
RED CELL DISTRIBUTION WIDTH-SD: 67.5 FL (ref 35.1–46.3)
RED CELL DISTRIBUTION WIDTH-SD: 68.9 FL (ref 35.1–46.3)
RED CELL DISTRIBUTION WIDTH-SD: 70.8 FL (ref 35.1–46.3)
SODIUM BLOOD GAS: 136 MMOL/L (ref 136–144)
SODIUM BLOOD GAS: 136 MMOL/L (ref 136–144)
SODIUM BLOOD GAS: 137 MMOL/L (ref 136–144)
SODIUM SERPL-SCNC: 136 MMOL/L (ref 136–144)
SODIUM SERPL-SCNC: 137 MMOL/L (ref 136–144)
SODIUM SERPL-SCNC: 139 MMOL/L (ref 136–144)
SODIUM SERPL-SCNC: 140 MMOL/L (ref 136–144)
SODIUM SERPL-SCNC: 141 MMOL/L (ref 136–144)
SODIUM SERPL-SCNC: 143 MMOL/L (ref 136–144)
T AXIS: -46 DEGREES
T AXIS: 105 DEGREES
T AXIS: 112 DEGREES
T AXIS: 218 DEGREES
T AXIS: 244 DEGREES
TIDAL VOLUME: 500 ML
TIDAL VOLUME: 500 ML
TOTAL HEMOGLOBIN: 10.1 G/DL (ref 13.2–17.3)
TOTAL HEMOGLOBIN: 10.2 G/DL (ref 13.2–17.3)
TOTAL HEMOGLOBIN: 9.1 G/DL (ref 13.2–17.3)
TRIGL SERPL-MCNC: 198 MG/DL (ref 30–149)
TRIGL SERPL-MCNC: 54 MG/DL (ref 30–149)
TROPONIN I SERPL-MCNC: 0.1 NG/ML (ref ?–0.05)
TROPONIN I SERPL-MCNC: 0.11 NG/ML (ref ?–0.05)
TSI SER-ACNC: 3.74 MIU/ML (ref 0.35–5.5)
VENT RATE: 20 /MIN
VENT RATE: 20 /MIN
VENTRICULAR RATE: 100 BPM
VENTRICULAR RATE: 101 BPM
VENTRICULAR RATE: 79 BPM
VENTRICULAR RATE: 79 BPM
VENTRICULAR RATE: 83 BPM
VLDLC SERPL CALC-MCNC: 40 MG/DL (ref 0–30)
WBC # BLD AUTO: 10.5 X10(3) UL (ref 4–13)
WBC # BLD AUTO: 12.7 X10(3) UL (ref 4–13)
WBC # BLD AUTO: 14 X10(3) UL (ref 4–13)
WBC # BLD AUTO: 15.9 X10(3) UL (ref 4–13)
WBC # BLD AUTO: 4 X10(3) UL (ref 4–13)
WBC # BLD AUTO: 4.6 X10(3) UL (ref 4–13)
WBC # BLD AUTO: 4.7 X10(3) UL (ref 4–13)
WBC # BLD AUTO: 5.7 X10(3) UL (ref 4–13)
WBC # BLD AUTO: 7.4 X10(3) UL (ref 4–13)
WBC # BLD AUTO: 7.9 X10(3) UL (ref 4–13)
WBC # BLD AUTO: 9.1 X10(3) UL (ref 4–13)
WBC # BLD AUTO: 9.4 X10(3) UL (ref 4–13)

## 2018-01-01 PROCEDURE — 99233 SBSQ HOSP IP/OBS HIGH 50: CPT | Performed by: INTERNAL MEDICINE

## 2018-01-01 PROCEDURE — 70450 CT HEAD/BRAIN W/O DYE: CPT | Performed by: STUDENT IN AN ORGANIZED HEALTH CARE EDUCATION/TRAINING PROGRAM

## 2018-01-01 PROCEDURE — 93971 EXTREMITY STUDY: CPT | Performed by: INTERNAL MEDICINE

## 2018-01-01 PROCEDURE — 99231 SBSQ HOSP IP/OBS SF/LOW 25: CPT | Performed by: INTERNAL MEDICINE

## 2018-01-01 PROCEDURE — 99233 SBSQ HOSP IP/OBS HIGH 50: CPT | Performed by: HOSPITALIST

## 2018-01-01 PROCEDURE — 99356 PROLONGED SERV,INPATIENT,1ST HR: CPT | Performed by: CLINICAL NURSE SPECIALIST

## 2018-01-01 PROCEDURE — 99291 CRITICAL CARE FIRST HOUR: CPT | Performed by: INTERNAL MEDICINE

## 2018-01-01 PROCEDURE — 71260 CT THORAX DX C+: CPT | Performed by: INTERNAL MEDICINE

## 2018-01-01 PROCEDURE — 70551 MRI BRAIN STEM W/O DYE: CPT | Performed by: OTHER

## 2018-01-01 PROCEDURE — 99233 SBSQ HOSP IP/OBS HIGH 50: CPT | Performed by: CLINICAL NURSE SPECIALIST

## 2018-01-01 PROCEDURE — 71045 X-RAY EXAM CHEST 1 VIEW: CPT | Performed by: HOSPITALIST

## 2018-01-01 PROCEDURE — 70498 CT ANGIOGRAPHY NECK: CPT | Performed by: EMERGENCY MEDICINE

## 2018-01-01 PROCEDURE — 71045 X-RAY EXAM CHEST 1 VIEW: CPT | Performed by: INTERNAL MEDICINE

## 2018-01-01 PROCEDURE — 90935 HEMODIALYSIS ONE EVALUATION: CPT | Performed by: INTERNAL MEDICINE

## 2018-01-01 PROCEDURE — 99232 SBSQ HOSP IP/OBS MODERATE 35: CPT | Performed by: HOSPITALIST

## 2018-01-01 PROCEDURE — 99233 SBSQ HOSP IP/OBS HIGH 50: CPT | Performed by: OTHER

## 2018-01-01 PROCEDURE — 84443 ASSAY THYROID STIM HORMONE: CPT | Performed by: INTERNAL MEDICINE

## 2018-01-01 PROCEDURE — 99223 1ST HOSP IP/OBS HIGH 75: CPT | Performed by: INTERNAL MEDICINE

## 2018-01-01 PROCEDURE — 80053 COMPREHEN METABOLIC PANEL: CPT | Performed by: INTERNAL MEDICINE

## 2018-01-01 PROCEDURE — 99222 1ST HOSP IP/OBS MODERATE 55: CPT | Performed by: OTHER

## 2018-01-01 PROCEDURE — 5A1D70Z PERFORMANCE OF URINARY FILTRATION, INTERMITTENT, LESS THAN 6 HOURS PER DAY: ICD-10-PCS | Performed by: INTERNAL MEDICINE

## 2018-01-01 PROCEDURE — 0BH18EZ INSERTION OF ENDOTRACHEAL AIRWAY INTO TRACHEA, VIA NATURAL OR ARTIFICIAL OPENING ENDOSCOPIC: ICD-10-PCS | Performed by: ANESTHESIOLOGY

## 2018-01-01 PROCEDURE — 99232 SBSQ HOSP IP/OBS MODERATE 35: CPT | Performed by: INTERNAL MEDICINE

## 2018-01-01 PROCEDURE — 02HV33Z INSERTION OF INFUSION DEVICE INTO SUPERIOR VENA CAVA, PERCUTANEOUS APPROACH: ICD-10-PCS | Performed by: RADIOLOGY

## 2018-01-01 PROCEDURE — 74019 RADEX ABDOMEN 2 VIEWS: CPT | Performed by: INTERNAL MEDICINE

## 2018-01-01 PROCEDURE — 93306 TTE W/DOPPLER COMPLETE: CPT | Performed by: HOSPITALIST

## 2018-01-01 PROCEDURE — 95816 EEG AWAKE AND DROWSY: CPT | Performed by: OTHER

## 2018-01-01 PROCEDURE — 85025 COMPLETE CBC W/AUTO DIFF WBC: CPT | Performed by: INTERNAL MEDICINE

## 2018-01-01 PROCEDURE — 84100 ASSAY OF PHOSPHORUS: CPT | Performed by: INTERNAL MEDICINE

## 2018-01-01 PROCEDURE — 4A133J1 MONITORING OF ARTERIAL PULSE, PERIPHERAL, PERCUTANEOUS APPROACH: ICD-10-PCS | Performed by: ANESTHESIOLOGY

## 2018-01-01 PROCEDURE — 99232 SBSQ HOSP IP/OBS MODERATE 35: CPT | Performed by: OTHER

## 2018-01-01 PROCEDURE — 99239 HOSP IP/OBS DSCHRG MGMT >30: CPT | Performed by: HOSPITALIST

## 2018-01-01 PROCEDURE — 0042T CT STROKE(DAWN BRAIN)CTA BRAIN/CTA NECK+PERF(CPT=70496/70498/0042T): CPT | Performed by: EMERGENCY MEDICINE

## 2018-01-01 PROCEDURE — 71045 X-RAY EXAM CHEST 1 VIEW: CPT | Performed by: NURSE PRACTITIONER

## 2018-01-01 PROCEDURE — 0W9G3ZZ DRAINAGE OF PERITONEAL CAVITY, PERCUTANEOUS APPROACH: ICD-10-PCS | Performed by: RADIOLOGY

## 2018-01-01 PROCEDURE — 99291 CRITICAL CARE FIRST HOUR: CPT | Performed by: HOSPITALIST

## 2018-01-01 PROCEDURE — 5A2204Z RESTORATION OF CARDIAC RHYTHM, SINGLE: ICD-10-PCS | Performed by: HOSPITALIST

## 2018-01-01 PROCEDURE — 83735 ASSAY OF MAGNESIUM: CPT | Performed by: INTERNAL MEDICINE

## 2018-01-01 PROCEDURE — 5A1955Z RESPIRATORY VENTILATION, GREATER THAN 96 CONSECUTIVE HOURS: ICD-10-PCS | Performed by: ANESTHESIOLOGY

## 2018-01-01 PROCEDURE — 71275 CT ANGIOGRAPHY CHEST: CPT | Performed by: INTERNAL MEDICINE

## 2018-01-01 PROCEDURE — 5A1955Z RESPIRATORY VENTILATION, GREATER THAN 96 CONSECUTIVE HOURS: ICD-10-PCS | Performed by: HOSPITALIST

## 2018-01-01 PROCEDURE — 0BBC8ZX EXCISION OF RIGHT UPPER LUNG LOBE, VIA NATURAL OR ARTIFICIAL OPENING ENDOSCOPIC, DIAGNOSTIC: ICD-10-PCS | Performed by: INTERNAL MEDICINE

## 2018-01-01 PROCEDURE — 70496 CT ANGIOGRAPHY HEAD: CPT | Performed by: EMERGENCY MEDICINE

## 2018-01-01 PROCEDURE — 3E033XZ INTRODUCTION OF VASOPRESSOR INTO PERIPHERAL VEIN, PERCUTANEOUS APPROACH: ICD-10-PCS | Performed by: ANESTHESIOLOGY

## 2018-01-01 PROCEDURE — 49083 ABD PARACENTESIS W/IMAGING: CPT | Performed by: INTERNAL MEDICINE

## 2018-01-01 PROCEDURE — 5A1D70Z PERFORMANCE OF URINARY FILTRATION, INTERMITTENT, LESS THAN 6 HOURS PER DAY: ICD-10-PCS | Performed by: HOSPITALIST

## 2018-01-01 PROCEDURE — 71045 X-RAY EXAM CHEST 1 VIEW: CPT | Performed by: STUDENT IN AN ORGANIZED HEALTH CARE EDUCATION/TRAINING PROGRAM

## 2018-01-01 PROCEDURE — 99231 SBSQ HOSP IP/OBS SF/LOW 25: CPT | Performed by: CLINICAL NURSE SPECIALIST

## 2018-01-01 PROCEDURE — 71045 X-RAY EXAM CHEST 1 VIEW: CPT | Performed by: EMERGENCY MEDICINE

## 2018-01-01 PROCEDURE — 03HY32Z INSERTION OF MONITORING DEVICE INTO UPPER ARTERY, PERCUTANEOUS APPROACH: ICD-10-PCS | Performed by: ANESTHESIOLOGY

## 2018-01-01 PROCEDURE — 80061 LIPID PANEL: CPT | Performed by: INTERNAL MEDICINE

## 2018-01-01 PROCEDURE — 99222 1ST HOSP IP/OBS MODERATE 55: CPT | Performed by: INTERNAL MEDICINE

## 2018-01-01 PROCEDURE — 74177 CT ABD & PELVIS W/CONTRAST: CPT | Performed by: INTERNAL MEDICINE

## 2018-01-01 PROCEDURE — 74019 RADEX ABDOMEN 2 VIEWS: CPT | Performed by: HOSPITALIST

## 2018-01-01 PROCEDURE — 70450 CT HEAD/BRAIN W/O DYE: CPT | Performed by: EMERGENCY MEDICINE

## 2018-01-01 PROCEDURE — 99282 EMERGENCY DEPT VISIT SF MDM: CPT

## 2018-01-01 PROCEDURE — 4A133B1 MONITORING OF ARTERIAL PRESSURE, PERIPHERAL, PERCUTANEOUS APPROACH: ICD-10-PCS | Performed by: ANESTHESIOLOGY

## 2018-01-01 PROCEDURE — 76604 US EXAM CHEST: CPT | Performed by: INTERNAL MEDICINE

## 2018-01-01 PROCEDURE — 90792 PSYCH DIAG EVAL W/MED SRVCS: CPT | Performed by: OTHER

## 2018-01-01 PROCEDURE — 99223 1ST HOSP IP/OBS HIGH 75: CPT | Performed by: HOSPITALIST

## 2018-01-01 RX ORDER — SODIUM CHLORIDE 9 MG/ML
INJECTION, SOLUTION INTRAVENOUS CONTINUOUS
Status: DISCONTINUED | OUTPATIENT
Start: 2018-01-01 | End: 2018-01-01

## 2018-01-01 RX ORDER — ONDANSETRON 2 MG/ML
4 INJECTION INTRAMUSCULAR; INTRAVENOUS EVERY 6 HOURS PRN
Status: DISCONTINUED | OUTPATIENT
Start: 2018-01-01 | End: 2018-01-01

## 2018-01-01 RX ORDER — SERTRALINE HYDROCHLORIDE 100 MG/1
100 TABLET, FILM COATED ORAL NIGHTLY
Status: DISCONTINUED | OUTPATIENT
Start: 2018-01-01 | End: 2018-01-01

## 2018-01-01 RX ORDER — LIDOCAINE HYDROCHLORIDE 10 MG/ML
INJECTION, SOLUTION EPIDURAL; INFILTRATION; INTRACAUDAL; PERINEURAL
Status: COMPLETED
Start: 2018-01-01 | End: 2018-01-01

## 2018-01-01 RX ORDER — DIPHENHYDRAMINE HCL 25 MG
25 TABLET ORAL EVERY 6 HOURS PRN
Status: ON HOLD | COMMUNITY
End: 2018-01-01

## 2018-01-01 RX ORDER — DEXTROSE AND SODIUM CHLORIDE 5; .9 G/100ML; G/100ML
INJECTION, SOLUTION INTRAVENOUS CONTINUOUS
Status: DISCONTINUED | OUTPATIENT
Start: 2018-01-01 | End: 2018-01-01

## 2018-01-01 RX ORDER — IPRATROPIUM BROMIDE AND ALBUTEROL SULFATE 2.5; .5 MG/3ML; MG/3ML
3 SOLUTION RESPIRATORY (INHALATION) EVERY 6 HOURS PRN
Status: ON HOLD | COMMUNITY
Start: 2018-01-01 | End: 2018-01-01 | Stop reason: ALTCHOICE

## 2018-01-01 RX ORDER — ACETAMINOPHEN 650 MG/1
650 SUPPOSITORY RECTAL EVERY 6 HOURS PRN
Status: DISCONTINUED | OUTPATIENT
Start: 2018-01-01 | End: 2018-01-01

## 2018-01-01 RX ORDER — HYDRALAZINE HYDROCHLORIDE 25 MG/1
TABLET, FILM COATED ORAL
Status: ON HOLD | COMMUNITY
Start: 2018-01-01 | End: 2018-01-01

## 2018-01-01 RX ORDER — HALOPERIDOL 5 MG/ML
2 INJECTION INTRAMUSCULAR ONCE
Status: COMPLETED | OUTPATIENT
Start: 2018-01-01 | End: 2018-01-01

## 2018-01-01 RX ORDER — ENOXAPARIN SODIUM 100 MG/ML
30 INJECTION SUBCUTANEOUS DAILY
Status: ON HOLD | COMMUNITY
End: 2018-01-01

## 2018-01-01 RX ORDER — HALOPERIDOL 5 MG/ML
2 INJECTION INTRAMUSCULAR
Status: DISCONTINUED | OUTPATIENT
Start: 2018-01-01 | End: 2018-01-01

## 2018-01-01 RX ORDER — ALBUMIN (HUMAN) 12.5 G/50ML
SOLUTION INTRAVENOUS
Status: DISPENSED
Start: 2018-01-01 | End: 2018-01-01

## 2018-01-01 RX ORDER — HEPARIN SODIUM 5000 [USP'U]/ML
80 INJECTION INTRAVENOUS; SUBCUTANEOUS ONCE
Status: DISCONTINUED | OUTPATIENT
Start: 2018-01-01 | End: 2018-01-01

## 2018-01-01 RX ORDER — BISACODYL 10 MG
10 SUPPOSITORY, RECTAL RECTAL
Status: DISCONTINUED | OUTPATIENT
Start: 2018-01-01 | End: 2018-01-01

## 2018-01-01 RX ORDER — HEPARIN SODIUM 5000 [USP'U]/ML
INJECTION, SOLUTION INTRAVENOUS; SUBCUTANEOUS
Status: DISPENSED
Start: 2018-01-01 | End: 2018-01-01

## 2018-01-01 RX ORDER — ACETAMINOPHEN 650 MG/1
650 SUPPOSITORY RECTAL
COMMUNITY
Start: 2018-01-01

## 2018-01-01 RX ORDER — CALCIUM CARBONATE 200(500)MG
1 TABLET,CHEWABLE ORAL EVERY 8 HOURS PRN
COMMUNITY

## 2018-01-01 RX ORDER — CALCITRIOL 0.25 UG/1
CAPSULE, LIQUID FILLED ORAL
COMMUNITY
Start: 2018-01-01

## 2018-01-01 RX ORDER — HALOPERIDOL 5 MG/ML
1 INJECTION INTRAMUSCULAR
Status: DISCONTINUED | OUTPATIENT
Start: 2018-01-01 | End: 2018-01-01

## 2018-01-01 RX ORDER — POLYETHYLENE GLYCOL 3350 17 G/17G
17 POWDER, FOR SOLUTION ORAL DAILY PRN
Status: DISCONTINUED | OUTPATIENT
Start: 2018-01-01 | End: 2018-01-01

## 2018-01-01 RX ORDER — RISPERIDONE 0.5 MG/1
TABLET, FILM COATED ORAL
Status: ON HOLD | COMMUNITY
Start: 2018-01-01 | End: 2018-01-01

## 2018-01-01 RX ORDER — POTASSIUM CHLORIDE 20 MEQ/1
20 TABLET, EXTENDED RELEASE ORAL ONCE
Status: DISCONTINUED | OUTPATIENT
Start: 2018-01-01 | End: 2018-01-01

## 2018-01-01 RX ORDER — MORPHINE SULFATE 4 MG/ML
2 INJECTION, SOLUTION INTRAMUSCULAR; INTRAVENOUS EVERY 2 HOUR PRN
Status: DISCONTINUED | OUTPATIENT
Start: 2018-01-01 | End: 2018-01-01

## 2018-01-01 RX ORDER — ALBUMIN (HUMAN) 12.5 G/50ML
100 SOLUTION INTRAVENOUS
Status: DISCONTINUED | OUTPATIENT
Start: 2018-01-01 | End: 2018-01-01

## 2018-01-01 RX ORDER — DEXTROSE MONOHYDRATE 25 G/50ML
50 INJECTION, SOLUTION INTRAVENOUS
Status: DISCONTINUED | OUTPATIENT
Start: 2018-01-01 | End: 2018-01-01

## 2018-01-01 RX ORDER — DEXTROSE MONOHYDRATE 100 MG/ML
INJECTION, SOLUTION INTRAVENOUS CONTINUOUS
Status: DISCONTINUED | OUTPATIENT
Start: 2018-01-01 | End: 2018-01-01

## 2018-01-01 RX ORDER — ACETAMINOPHEN 160 MG/5ML
650 SOLUTION ORAL EVERY 6 HOURS PRN
Status: DISCONTINUED | OUTPATIENT
Start: 2018-01-01 | End: 2018-01-01

## 2018-01-01 RX ORDER — CHLORHEXIDINE GLUCONATE 0.12 MG/ML
15 RINSE ORAL
Status: DISCONTINUED | OUTPATIENT
Start: 2018-01-01 | End: 2018-01-01

## 2018-01-01 RX ORDER — ACETAMINOPHEN 325 MG/1
650 TABLET ORAL EVERY 6 HOURS PRN
Status: DISCONTINUED | OUTPATIENT
Start: 2018-01-01 | End: 2018-01-01

## 2018-01-01 RX ORDER — HYDRALAZINE HYDROCHLORIDE 25 MG/1
25 TABLET, FILM COATED ORAL EVERY 8 HOURS
Status: ON HOLD | COMMUNITY
End: 2018-01-01

## 2018-01-01 RX ORDER — LORAZEPAM 2 MG/ML
2 INJECTION INTRAMUSCULAR EVERY 4 HOURS PRN
Status: DISCONTINUED | OUTPATIENT
Start: 2018-01-01 | End: 2018-01-01

## 2018-01-01 RX ORDER — ARIPIPRAZOLE 15 MG/1
40 TABLET ORAL ONCE
Status: COMPLETED | OUTPATIENT
Start: 2018-01-01 | End: 2018-01-01

## 2018-01-01 RX ORDER — DEXTROSE MONOHYDRATE 25 G/50ML
50 INJECTION, SOLUTION INTRAVENOUS ONCE
Status: COMPLETED | OUTPATIENT
Start: 2018-01-01 | End: 2018-01-01

## 2018-01-01 RX ORDER — FUROSEMIDE 10 MG/ML
40 INJECTION INTRAMUSCULAR; INTRAVENOUS EVERY 8 HOURS PRN
Status: DISCONTINUED | OUTPATIENT
Start: 2018-01-01 | End: 2018-01-01

## 2018-01-01 RX ORDER — SODIUM CHLORIDE 9 MG/ML
INJECTION, SOLUTION INTRAVENOUS ONCE
Status: COMPLETED | OUTPATIENT
Start: 2018-01-01 | End: 2018-01-01

## 2018-01-01 RX ORDER — MIDODRINE HYDROCHLORIDE 10 MG/1
10 TABLET ORAL 3 TIMES DAILY
Status: DISCONTINUED | OUTPATIENT
Start: 2018-01-01 | End: 2018-01-01

## 2018-01-01 RX ORDER — WARFARIN SODIUM 2 MG/1
2 TABLET ORAL
Status: COMPLETED | OUTPATIENT
Start: 2018-01-01 | End: 2018-01-01

## 2018-01-01 RX ORDER — ATORVASTATIN CALCIUM 40 MG/1
80 TABLET, FILM COATED ORAL NIGHTLY
Status: DISCONTINUED | OUTPATIENT
Start: 2018-01-01 | End: 2018-01-01

## 2018-01-01 RX ORDER — WARFARIN SODIUM 2 MG/1
2 TABLET ORAL NIGHTLY
Status: DISCONTINUED | OUTPATIENT
Start: 2018-01-01 | End: 2018-01-01

## 2018-01-01 RX ORDER — CALCIUM CARBONATE 200(500)MG
1000 TABLET,CHEWABLE ORAL 2 TIMES DAILY PRN
Status: DISCONTINUED | OUTPATIENT
Start: 2018-01-01 | End: 2018-01-01

## 2018-01-01 RX ORDER — BUDESONIDE AND FORMOTEROL FUMARATE DIHYDRATE 160; 4.5 UG/1; UG/1
2 AEROSOL RESPIRATORY (INHALATION) 2 TIMES DAILY
COMMUNITY

## 2018-01-01 RX ORDER — PANTOPRAZOLE SODIUM 40 MG/1
40 TABLET, DELAYED RELEASE ORAL
COMMUNITY

## 2018-01-01 RX ORDER — HEPARIN SODIUM AND DEXTROSE 10000; 5 [USP'U]/100ML; G/100ML
18 INJECTION INTRAVENOUS ONCE
Status: COMPLETED | OUTPATIENT
Start: 2018-01-01 | End: 2018-01-01

## 2018-01-01 RX ORDER — METOCLOPRAMIDE HYDROCHLORIDE 5 MG/ML
10 INJECTION INTRAMUSCULAR; INTRAVENOUS EVERY 8 HOURS PRN
Status: DISCONTINUED | OUTPATIENT
Start: 2018-01-01 | End: 2018-01-01

## 2018-01-01 RX ORDER — ETOMIDATE 2 MG/ML
INJECTION INTRAVENOUS
Status: COMPLETED
Start: 2018-01-01 | End: 2018-01-01

## 2018-01-01 RX ORDER — PREDNISONE 20 MG/1
TABLET ORAL
Status: ON HOLD | COMMUNITY
Start: 2018-01-01 | End: 2018-01-01

## 2018-01-01 RX ORDER — METOCLOPRAMIDE HYDROCHLORIDE 5 MG/ML
2.5 INJECTION INTRAMUSCULAR; INTRAVENOUS EVERY 8 HOURS
Status: DISCONTINUED | OUTPATIENT
Start: 2018-01-01 | End: 2018-01-01

## 2018-01-01 RX ORDER — HEPARIN SODIUM AND DEXTROSE 10000; 5 [USP'U]/100ML; G/100ML
12 INJECTION INTRAVENOUS ONCE
Status: COMPLETED | OUTPATIENT
Start: 2018-01-01 | End: 2018-01-01

## 2018-01-01 RX ORDER — MIDAZOLAM HYDROCHLORIDE 1 MG/ML
INJECTION INTRAMUSCULAR; INTRAVENOUS
Status: COMPLETED
Start: 2018-01-01 | End: 2018-01-01

## 2018-01-01 RX ORDER — HEPARIN SODIUM 5000 [USP'U]/ML
30 INJECTION, SOLUTION INTRAVENOUS; SUBCUTANEOUS ONCE
Status: COMPLETED | OUTPATIENT
Start: 2018-01-01 | End: 2018-01-01

## 2018-01-01 RX ORDER — DOCUSATE SODIUM 100 MG/1
100 CAPSULE, LIQUID FILLED ORAL 2 TIMES DAILY
Status: DISCONTINUED | OUTPATIENT
Start: 2018-01-01 | End: 2018-01-01

## 2018-01-01 RX ORDER — CARVEDILOL 6.25 MG/1
TABLET ORAL
Status: ON HOLD | COMMUNITY
Start: 2018-01-01 | End: 2018-01-01

## 2018-01-01 RX ORDER — HEPARIN SODIUM 5000 [USP'U]/ML
INJECTION, SOLUTION INTRAVENOUS; SUBCUTANEOUS
Status: DISCONTINUED
Start: 2018-01-01 | End: 2018-01-01 | Stop reason: WASHOUT

## 2018-01-01 RX ORDER — SERTRALINE HYDROCHLORIDE 100 MG/1
100 TABLET, FILM COATED ORAL NIGHTLY
COMMUNITY

## 2018-01-01 RX ORDER — ATROPINE SULFATE 10 MG/ML
2 SOLUTION/ DROPS OPHTHALMIC EVERY 2 HOUR PRN
Status: DISCONTINUED | OUTPATIENT
Start: 2018-01-01 | End: 2018-01-01

## 2018-01-01 RX ORDER — WARFARIN SODIUM 5 MG/1
5 TABLET ORAL NIGHTLY
Status: DISCONTINUED | OUTPATIENT
Start: 2018-01-01 | End: 2018-01-01

## 2018-01-01 RX ORDER — ALBUTEROL SULFATE 2.5 MG/3ML
2.5 SOLUTION RESPIRATORY (INHALATION)
Status: DISCONTINUED | OUTPATIENT
Start: 2018-01-01 | End: 2018-01-01

## 2018-01-01 RX ORDER — FUROSEMIDE 40 MG/1
40 TABLET ORAL EVERY 8 HOURS PRN
Status: DISCONTINUED | OUTPATIENT
Start: 2018-01-01 | End: 2018-01-01

## 2018-01-01 RX ORDER — MIRTAZAPINE 30 MG/1
TABLET, FILM COATED ORAL
COMMUNITY
Start: 2018-01-01

## 2018-01-01 RX ORDER — IPRATROPIUM BROMIDE AND ALBUTEROL SULFATE 2.5; .5 MG/3ML; MG/3ML
3 SOLUTION RESPIRATORY (INHALATION) EVERY 6 HOURS PRN
COMMUNITY

## 2018-01-01 RX ORDER — MIDAZOLAM HYDROCHLORIDE 1 MG/ML
2 INJECTION INTRAMUSCULAR; INTRAVENOUS EVERY 5 MIN PRN
Status: DISCONTINUED | OUTPATIENT
Start: 2018-01-01 | End: 2018-01-01

## 2018-01-01 RX ORDER — ALBUMIN (HUMAN) 12.5 G/50ML
100 SOLUTION INTRAVENOUS AS NEEDED
Status: DISCONTINUED | OUTPATIENT
Start: 2018-01-01 | End: 2018-01-01

## 2018-01-01 RX ORDER — FAMOTIDINE 20 MG/1
20 TABLET ORAL DAILY
Status: DISCONTINUED | OUTPATIENT
Start: 2018-01-01 | End: 2018-01-01

## 2018-01-01 RX ORDER — ARIPIPRAZOLE 15 MG/1
20 TABLET ORAL ONCE
Status: COMPLETED | OUTPATIENT
Start: 2018-01-01 | End: 2018-01-01

## 2018-01-01 RX ORDER — METOCLOPRAMIDE HYDROCHLORIDE 5 MG/ML
5 INJECTION INTRAMUSCULAR; INTRAVENOUS EVERY 8 HOURS PRN
Status: DISCONTINUED | OUTPATIENT
Start: 2018-01-01 | End: 2018-01-01

## 2018-01-01 RX ORDER — LORAZEPAM 1 MG/1
1 TABLET ORAL
Status: ON HOLD | COMMUNITY
Start: 2018-01-01 | End: 2018-01-01

## 2018-01-01 RX ORDER — MIDODRINE HYDROCHLORIDE 5 MG/1
5 TABLET ORAL 3 TIMES DAILY
Status: DISCONTINUED | OUTPATIENT
Start: 2018-01-01 | End: 2018-01-01

## 2018-01-01 RX ORDER — DEXTROSE MONOHYDRATE 25 G/50ML
50 INJECTION, SOLUTION INTRAVENOUS AS NEEDED
Status: DISCONTINUED | OUTPATIENT
Start: 2018-01-01 | End: 2018-01-01

## 2018-01-01 RX ORDER — WARFARIN SODIUM 2 MG/1
2 TABLET ORAL NIGHTLY
Qty: 30 TABLET | Refills: 0 | Status: SHIPPED | OUTPATIENT
Start: 2018-01-01

## 2018-01-01 RX ORDER — FAMOTIDINE 20 MG/1
20 TABLET ORAL
COMMUNITY
Start: 2018-01-01

## 2018-01-01 RX ORDER — LORAZEPAM 2 MG/ML
0.5 INJECTION INTRAMUSCULAR EVERY 4 HOURS PRN
Status: DISCONTINUED | OUTPATIENT
Start: 2018-01-01 | End: 2018-01-01

## 2018-01-01 RX ORDER — HEPARIN SODIUM AND DEXTROSE 10000; 5 [USP'U]/100ML; G/100ML
INJECTION INTRAVENOUS CONTINUOUS
Status: DISCONTINUED | OUTPATIENT
Start: 2018-01-01 | End: 2018-01-01

## 2018-01-01 RX ORDER — SODIUM PHOSPHATE, DIBASIC AND SODIUM PHOSPHATE, MONOBASIC 7; 19 G/133ML; G/133ML
1 ENEMA RECTAL ONCE AS NEEDED
Status: DISCONTINUED | OUTPATIENT
Start: 2018-01-01 | End: 2018-01-01

## 2018-01-01 RX ORDER — RISPERIDONE 1 MG/1
1 TABLET, FILM COATED ORAL 2 TIMES DAILY
COMMUNITY

## 2018-01-01 RX ORDER — AMLODIPINE BESYLATE 5 MG/1
5 TABLET ORAL DAILY
Status: ON HOLD | COMMUNITY
Start: 2018-01-01 | End: 2018-01-01

## 2018-01-01 RX ORDER — NICOTINE 21 MG/24HR
21 PATCH, TRANSDERMAL 24 HOURS TRANSDERMAL
COMMUNITY
Start: 2018-01-01

## 2018-01-01 RX ORDER — FOLIC ACID 1 MG/1
1 TABLET ORAL DAILY
Status: DISCONTINUED | OUTPATIENT
Start: 2018-01-01 | End: 2018-01-01

## 2018-01-01 RX ORDER — DEXTROSE MONOHYDRATE 25 G/50ML
INJECTION, SOLUTION INTRAVENOUS
Status: COMPLETED
Start: 2018-01-01 | End: 2018-01-01

## 2018-01-01 RX ORDER — ALBUMIN (HUMAN) 12.5 G/50ML
25 SOLUTION INTRAVENOUS ONCE
Status: COMPLETED | OUTPATIENT
Start: 2018-01-01 | End: 2018-01-01

## 2018-01-01 RX ORDER — DEXMEDETOMIDINE HYDROCHLORIDE 4 UG/ML
INJECTION, SOLUTION INTRAVENOUS CONTINUOUS
Status: DISCONTINUED | OUTPATIENT
Start: 2018-01-01 | End: 2018-01-01

## 2018-01-01 RX ORDER — PANTOPRAZOLE SODIUM 40 MG/1
40 TABLET, DELAYED RELEASE ORAL
Status: DISCONTINUED | OUTPATIENT
Start: 2018-01-01 | End: 2018-01-01

## 2018-01-01 RX ORDER — FOLIC ACID 1 MG/1
1 TABLET ORAL DAILY
COMMUNITY
Start: 2018-01-01

## 2018-01-01 RX ORDER — ETOMIDATE 2 MG/ML
8 INJECTION INTRAVENOUS ONCE
Status: COMPLETED | OUTPATIENT
Start: 2018-01-01 | End: 2018-01-01

## 2018-01-01 RX ORDER — SCOLOPAMINE TRANSDERMAL SYSTEM 1 MG/1
1 PATCH, EXTENDED RELEASE TRANSDERMAL
Status: DISCONTINUED | OUTPATIENT
Start: 2018-01-01 | End: 2018-01-01

## 2018-01-01 RX ORDER — MORPHINE SULFATE 4 MG/ML
1 INJECTION, SOLUTION INTRAMUSCULAR; INTRAVENOUS
Status: DISCONTINUED | OUTPATIENT
Start: 2018-01-01 | End: 2018-01-01

## 2018-01-01 RX ORDER — GLYCOPYRROLATE 0.2 MG/ML
0.4 INJECTION, SOLUTION INTRAMUSCULAR; INTRAVENOUS
Status: DISCONTINUED | OUTPATIENT
Start: 2018-01-01 | End: 2018-01-01

## 2018-01-01 RX ORDER — AMOXICILLIN AND CLAVULANATE POTASSIUM 500; 125 MG/1; MG/1
500 TABLET, FILM COATED ORAL DAILY
Status: DISCONTINUED | OUTPATIENT
Start: 2018-01-01 | End: 2018-01-01

## 2018-01-01 RX ORDER — LORAZEPAM 2 MG/ML
1 INJECTION INTRAMUSCULAR EVERY 4 HOURS PRN
Status: DISCONTINUED | OUTPATIENT
Start: 2018-01-01 | End: 2018-01-01

## 2018-01-01 RX ORDER — LEVOCARNITINE 1 G/10ML
990 SOLUTION ORAL EVERY 8 HOURS
Status: DISCONTINUED | OUTPATIENT
Start: 2018-01-01 | End: 2018-01-01

## 2018-01-01 RX ORDER — PREDNISONE 10 MG/1
TABLET ORAL
Status: ON HOLD | COMMUNITY
Start: 2018-01-01 | End: 2018-01-01

## 2018-01-01 RX ORDER — HALOPERIDOL 5 MG/ML
1 INJECTION INTRAMUSCULAR ONCE
Status: COMPLETED | OUTPATIENT
Start: 2018-01-01 | End: 2018-01-01

## 2018-01-01 RX ORDER — HALOPERIDOL 2 MG/1
2 TABLET ORAL EVERY 4 HOURS PRN
COMMUNITY

## 2018-01-01 RX ORDER — HEPARIN SODIUM 5000 [USP'U]/ML
80 INJECTION INTRAVENOUS; SUBCUTANEOUS ONCE
Status: COMPLETED | OUTPATIENT
Start: 2018-01-01 | End: 2018-01-01

## 2018-01-01 RX ORDER — CALCITRIOL 0.25 UG/1
0.5 CAPSULE, LIQUID FILLED ORAL DAILY
Status: DISCONTINUED | OUTPATIENT
Start: 2018-01-01 | End: 2018-01-01

## 2018-01-01 RX ORDER — ATORVASTATIN CALCIUM 80 MG/1
80 TABLET, FILM COATED ORAL NIGHTLY
COMMUNITY
Start: 2018-01-01

## 2018-01-01 RX ORDER — ONDANSETRON 4 MG/1
4 TABLET, ORALLY DISINTEGRATING ORAL EVERY 6 HOURS PRN
Status: DISCONTINUED | OUTPATIENT
Start: 2018-01-01 | End: 2018-01-01

## 2018-01-01 RX ORDER — CALCIUM CARBONATE 200(500)MG
500 TABLET,CHEWABLE ORAL
Status: DISCONTINUED | OUTPATIENT
Start: 2018-01-01 | End: 2018-01-01

## 2018-01-01 RX ORDER — HEPARIN SODIUM 5000 [USP'U]/ML
5000 INJECTION, SOLUTION INTRAVENOUS; SUBCUTANEOUS EVERY 8 HOURS SCHEDULED
Status: DISCONTINUED | OUTPATIENT
Start: 2018-01-01 | End: 2018-01-01

## 2018-01-01 RX ORDER — ARIPIPRAZOLE 5 MG/1
5 TABLET ORAL DAILY
Status: DISCONTINUED | OUTPATIENT
Start: 2018-01-01 | End: 2018-01-01

## 2018-04-09 NOTE — ED PROVIDER NOTES
Patient Seen in: [facility] AND [facility] Emergency Department    History   Patient presents with:  Pain (neurologic)    Stated Complaint: leg pain     HPI    Pt is 62 yo M who arrives by EMS from home because he wants his AVF in right leg removed.  Pt has had f as fistula does not need to be emergently removed at this time.            Disposition and Plan     Clinical Impression:  Pain from A/V fistula [facility])  (primary encounter diagnosis)    Disposition:  Discharge  4/8/2018 10:49 pm    Follow-up:  Kaylynn Noyola

## 2018-04-09 NOTE — CM/SW NOTE
Met with patient, to assist with transportation, patient has medicaid as secondary insurance, [employer] transportation arranged  [employer]ia [employer] , [employer] transportation.    CTS form completed and givent to Nurse Kamron Rizzo to give to [address] upon arrival.    ETA [phone]

## 2018-04-09 NOTE — ED INITIAL ASSESSMENT (HPI)
Pt c/o right leg pain that has been ongoing for 6 months that has been getting progressively worse. Pt states that fluid buildup causes the pain.

## 2018-05-09 ENCOUNTER — HOSPITAL (OUTPATIENT)
Dept: OTHER | Age: 58
End: 2018-05-09
Attending: PSYCHIATRY & NEUROLOGY

## 2018-05-09 LAB — GLUCOSE BLDC GLUCOMTR-MCNC: 155 MG/DL (ref 65–99)

## 2018-05-10 ENCOUNTER — DIAGNOSTIC TRANS (OUTPATIENT)
Dept: OTHER | Age: 58
End: 2018-05-10

## 2018-05-10 LAB
ALBUMIN SERPL-MCNC: 3.1 GM/DL (ref 3.6–5.1)
ALBUMIN/GLOB SERPL: 0.6 {RATIO} (ref 1–2.4)
ALP SERPL-CCNC: 116 UNIT/L (ref 45–117)
ALT SERPL-CCNC: 14 UNIT/L
ANALYZER ANC (IANC): ABNORMAL
ANION GAP SERPL CALC-SCNC: 19 MMOL/L (ref 10–20)
AST SERPL-CCNC: 28 UNIT/L
BASOPHILS # BLD: 0 THOUSAND/MCL (ref 0–0.3)
BASOPHILS NFR BLD: 1 %
BILIRUB SERPL-MCNC: 0.3 MG/DL (ref 0.2–1)
BUN SERPL-MCNC: 77 MG/DL (ref 6–20)
BUN/CREAT SERPL: 10 (ref 7–25)
CALCIUM SERPL-MCNC: 7.6 MG/DL (ref 8.4–10.2)
CHLORIDE: 97 MMOL/L (ref 98–107)
CHOLEST SERPL-MCNC: 117 MG/DL
CHOLEST/HDLC SERPL: 1.6 {RATIO}
CO2 SERPL-SCNC: 24 MMOL/L (ref 21–32)
CREAT SERPL-MCNC: 8 MG/DL (ref 0.67–1.17)
DIFFERENTIAL METHOD BLD: ABNORMAL
EOSINOPHIL # BLD: 0.5 THOUSAND/MCL (ref 0.1–0.5)
EOSINOPHIL NFR BLD: 12 %
ERYTHROCYTE [DISTWIDTH] IN BLOOD: 16.5 % (ref 11–15)
GLOBULIN SER-MCNC: 5.1 GM/DL (ref 2–4)
GLUCOSE SERPL-MCNC: 95 MG/DL (ref 65–99)
GLYCOHEMOGLOBIN: 5.5 % (ref 4.5–5.6)
HDLC SERPL-MCNC: 72 MG/DL
HEMATOCRIT: 25.8 % (ref 39–51)
HGB BLD-MCNC: 8.3 GM/DL (ref 13–17)
IMM GRANULOCYTES # BLD AUTO: 0 THOUSAND/MCL (ref 0–0.2)
IMM GRANULOCYTES NFR BLD: 0 %
LDLC SERPL CALC-MCNC: 36 MG/DL
LYMPHOCYTES # BLD: 0.4 THOUSAND/MCL (ref 1–4)
LYMPHOCYTES NFR BLD: 11 %
MCH RBC QN AUTO: 31 PG (ref 26–34)
MCHC RBC AUTO-ENTMCNC: 32.2 GM/DL (ref 32–36.5)
MCV RBC AUTO: 96.3 FL (ref 78–100)
MONOCYTES # BLD: 0.3 THOUSAND/MCL (ref 0.3–0.9)
MONOCYTES NFR BLD: 7 %
NEUTROPHILS # BLD: 2.5 THOUSAND/MCL (ref 1.8–7.7)
NEUTROPHILS NFR BLD: 69 %
NEUTS SEG NFR BLD: ABNORMAL %
NONHDLC SERPL-MCNC: 45 MG/DL
PERCENT NRBC: 0 %
PLATELET # BLD: 125 THOUSAND/MCL (ref 140–450)
POTASSIUM SERPL-SCNC: 5.1 MMOL/L (ref 3.4–5.1)
PROT SERPL-MCNC: 8.2 GM/DL (ref 6.4–8.2)
RBC # BLD: 2.68 MILLION/MCL (ref 4.5–5.9)
SODIUM SERPL-SCNC: 135 MMOL/L (ref 135–145)
T4 FREE SERPL-MCNC: 0.9 NG/DL (ref 0.8–1.5)
TRIGLYCERIDE (TRIGP): 47 MG/DL
TSH SERPL-ACNC: 5.83 MCUNIT/ML (ref 0.35–5)
WBC # BLD: 3.7 THOUSAND/MCL (ref 4.2–11)

## 2018-05-11 LAB
ALBUMIN SERPL-MCNC: 2.8 GM/DL (ref 3.6–5.1)
ALBUMIN/GLOB SERPL: 0.6 {RATIO} (ref 1–2.4)
ALP SERPL-CCNC: 105 UNIT/L (ref 45–117)
ALT SERPL-CCNC: 8 UNIT/L
ANALYZER ANC (IANC): ABNORMAL
ANION GAP SERPL CALC-SCNC: 12 MMOL/L (ref 10–20)
AST SERPL-CCNC: 23 UNIT/L
BILIRUB SERPL-MCNC: 0.3 MG/DL (ref 0.2–1)
BUN SERPL-MCNC: 43 MG/DL (ref 6–20)
BUN/CREAT SERPL: 9 (ref 7–25)
CALCIUM SERPL-MCNC: 7.5 MG/DL (ref 8.4–10.2)
CHLORIDE: 99 MMOL/L (ref 98–107)
CO2 SERPL-SCNC: 29 MMOL/L (ref 21–32)
CREAT SERPL-MCNC: 5 MG/DL (ref 0.67–1.17)
ERYTHROCYTE [DISTWIDTH] IN BLOOD: 16.7 % (ref 11–15)
GLOBULIN SER-MCNC: 4.9 GM/DL (ref 2–4)
GLUCOSE SERPL-MCNC: 88 MG/DL (ref 65–99)
GLYCOHEMOGLOBIN: 6 % (ref 4.5–5.6)
HEMATOCRIT: 27 % (ref 39–51)
HGB BLD-MCNC: 8.6 GM/DL (ref 13–17)
MCH RBC QN AUTO: 30.9 PG (ref 26–34)
MCHC RBC AUTO-ENTMCNC: 31.9 GM/DL (ref 32–36.5)
MCV RBC AUTO: 97.1 FL (ref 78–100)
PERCENT NRBC: 0 %
PHOSPHATE SERPL-MCNC: 3.5 MG/DL (ref 2.4–4.7)
PLATELET # BLD: 92 THOUSAND/MCL (ref 140–450)
POTASSIUM SERPL-SCNC: 4 MMOL/L (ref 3.4–5.1)
POTASSIUM SERPL-SCNC: 4.2 MMOL/L (ref 3.4–5.1)
PROT SERPL-MCNC: 7.7 GM/DL (ref 6.4–8.2)
RBC # BLD: 2.78 MILLION/MCL (ref 4.5–5.9)
SODIUM SERPL-SCNC: 136 MMOL/L (ref 135–145)
WBC # BLD: 3.2 THOUSAND/MCL (ref 4.2–11)

## 2018-05-12 LAB
ANION GAP SERPL CALC-SCNC: 13 MMOL/L (ref 10–20)
BUN SERPL-MCNC: 65 MG/DL (ref 6–20)
BUN/CREAT SERPL: 9 (ref 7–25)
CALCIUM SERPL-MCNC: 7.6 MG/DL (ref 8.4–10.2)
CHLORIDE: 97 MMOL/L (ref 98–107)
CO2 SERPL-SCNC: 29 MMOL/L (ref 21–32)
CREAT SERPL-MCNC: 7.1 MG/DL (ref 0.67–1.17)
GLUCOSE SERPL-MCNC: 118 MG/DL (ref 65–99)
POTASSIUM SERPL-SCNC: 3.9 MMOL/L (ref 3.4–5.1)
SODIUM SERPL-SCNC: 135 MMOL/L (ref 135–145)

## 2018-05-15 LAB
ANION GAP SERPL CALC-SCNC: 17 MMOL/L (ref 10–20)
BUN SERPL-MCNC: 79 MG/DL (ref 6–20)
BUN/CREAT SERPL: 10 (ref 7–25)
CALCIUM SERPL-MCNC: 8.1 MG/DL (ref 8.4–10.2)
CHLORIDE: 99 MMOL/L (ref 98–107)
CO2 SERPL-SCNC: 28 MMOL/L (ref 21–32)
CREAT SERPL-MCNC: 8.3 MG/DL (ref 0.67–1.17)
GLUCOSE SERPL-MCNC: 134 MG/DL (ref 65–99)
POTASSIUM SERPL-SCNC: 4.6 MMOL/L (ref 3.4–5.1)
SODIUM SERPL-SCNC: 139 MMOL/L (ref 135–145)

## 2018-08-17 ENCOUNTER — HOSPITAL (OUTPATIENT)
Dept: OTHER | Age: 58
End: 2018-08-17
Attending: HOSPITALIST

## 2018-08-17 ENCOUNTER — CHARTING TRANS (OUTPATIENT)
Dept: OTHER | Age: 58
End: 2018-08-17

## 2018-08-17 LAB
ALBUMIN SERPL-MCNC: 3 GM/DL (ref 3.6–5.1)
ALBUMIN/GLOB SERPL: 0.9 {RATIO} (ref 1–2.4)
ALP SERPL-CCNC: 124 UNIT/L (ref 45–117)
ALT SERPL-CCNC: 34 UNIT/L
ANALYZER ANC (IANC): ABNORMAL
ANION GAP SERPL CALC-SCNC: 17 MMOL/L (ref 10–20)
APTT PPP: 26 SECONDS (ref 22–30)
APTT PPP: NORMAL S
AST SERPL-CCNC: 14 UNIT/L
BASE DEFICIT BLDA-SCNC: 5 MMOL/L (ref 0–2)
BASE DEFICIT BLDV-SCNC: 6 MMOL/L (ref 0–2)
BASE EXCESS BLDA CALC-SCNC: ABNORMAL MMOL/L
BASE EXCESS-RC: ABNORMAL
BASOPHILS # BLD: 0 THOUSAND/MCL (ref 0–0.3)
BASOPHILS NFR BLD: 0 %
BDY SITE: ABNORMAL
BDY SITE: ABNORMAL
BILIRUB SERPL-MCNC: 0.3 MG/DL (ref 0.2–1)
BODY TEMPERATURE: 37 DEGREES
BODY TEMPERATURE: 37 DEGREES
BUN SERPL-MCNC: 80 MG/DL (ref 6–20)
BUN/CREAT SERPL: 11 (ref 7–25)
CA-I BLDA-SCNC: 13 % (ref 15–23)
CALCIUM SERPL-MCNC: 8.8 MG/DL (ref 8.4–10.2)
CHLORIDE: 99 MMOL/L (ref 98–107)
CO2 SERPL-SCNC: 26 MMOL/L (ref 21–32)
COHGB MFR BLD: 1.7 %
CONDITION: ABNORMAL
CREAT SERPL-MCNC: 7.48 MG/DL (ref 0.67–1.17)
DIFFERENTIAL METHOD BLD: ABNORMAL
EOSINOPHIL # BLD: 0.1 THOUSAND/MCL (ref 0.1–0.5)
EOSINOPHIL NFR BLD: 2 %
ERYTHROCYTE [DISTWIDTH] IN BLOOD: 19.1 % (ref 11–15)
GLOBULIN SER-MCNC: 3.5 GM/DL (ref 2–4)
GLUCOSE SERPL-MCNC: 87 MG/DL (ref 65–99)
HCO3 BLDA-SCNC: 24 MMOL/L (ref 22–28)
HCO3 BLDV-SCNC: 25 MMOL/L (ref 22–28)
HCT VFR BLD CALC: 29 % (ref 39–51)
HEMATOCRIT: 30.4 % (ref 39–51)
HGB BLD-MCNC: 9.5 GM/DL (ref 13–17)
HGB BLD-MCNC: 9.7 GM/DL (ref 13–17)
HOROWITZ INDEX BLD+IHG-RTO: 48 %
HOROWITZ INDEX BLD+IHG-RTO: ABNORMAL MM[HG]
INR PPP: 1.2
LYMPHOCYTES # BLD: 0.7 THOUSAND/MCL (ref 1–4)
LYMPHOCYTES NFR BLD: 18 %
MCH RBC QN AUTO: 31 PG (ref 26–34)
MCHC RBC AUTO-ENTMCNC: 31.9 GM/DL (ref 32–36.5)
MCV RBC AUTO: 97.1 FL (ref 78–100)
METHGB MFR BLD: 1.2 %
MONOCYTES # BLD: 0.2 THOUSAND/MCL (ref 0.3–0.9)
MONOCYTES NFR BLD: 5 %
NEUTROPHILS # BLD: 2.9 THOUSAND/MCL (ref 1.8–7.7)
NEUTROPHILS NFR BLD: 75 %
NEUTS SEG NFR BLD: ABNORMAL %
NRBC (NRBCRE): ABNORMAL
OXYHGB MFR BLD: 94.7 % (ref 94–98)
PAO2 / FIO2 RATIO (RPFR): 188 (ref 300–500)
PCO2 BLDA: 63 MM HG (ref 35–48)
PCO2 BLDV: 78 MM HG (ref 41–54)
PH BLDA: 7.18 UNIT (ref 7.35–7.45)
PH BLDV: 7.12 UNIT (ref 7.35–7.45)
PLATELET # BLD: 137 THOUSAND/MCL (ref 140–450)
PO2 BLDA: 90 MM HG (ref 83–108)
PO2 BLDV: 41 MM HG (ref 35–42)
POTASSIUM SERPL-SCNC: 4.6 MMOL/L (ref 3.4–5.1)
PROCALCITONIN SERPL IA-MCNC: 1.09 NG/ML
PROT SERPL-MCNC: 6.5 GM/DL (ref 6.4–8.2)
PROTHROMBIN TIME: 12 SECONDS (ref 9.7–11.8)
PROTHROMBIN TIME: ABNORMAL
RBC # BLD: 3.13 MILLION/MCL (ref 4.5–5.9)
SAO2 % BLDA: 97 % (ref 95–99)
SAO2 % BLDV: 57 % (ref 60–80)
SODIUM SERPL-SCNC: 137 MMOL/L (ref 135–145)
TROPONIN I SERPL HS-MCNC: 0.18 NG/ML
TROPONIN I SERPL HS-MCNC: 0.19 NG/ML
WBC # BLD: 3.9 THOUSAND/MCL (ref 4.2–11)

## 2018-08-18 ENCOUNTER — CHARTING TRANS (OUTPATIENT)
Dept: OTHER | Age: 58
End: 2018-08-18

## 2018-08-18 LAB
ALBUMIN SERPL-MCNC: 3 GM/DL (ref 3.6–5.1)
ALBUMIN/GLOB SERPL: 0.9 {RATIO} (ref 1–2.4)
ALP SERPL-CCNC: 140 UNIT/L (ref 45–117)
ALT SERPL-CCNC: 33 UNIT/L
AMYLASE SERPL-CCNC: 152 UNIT/L (ref 25–115)
ANION GAP SERPL CALC-SCNC: 19 MMOL/L (ref 10–20)
AST SERPL-CCNC: 12 UNIT/L
BILIRUB SERPL-MCNC: 0.4 MG/DL (ref 0.2–1)
BUN SERPL-MCNC: 95 MG/DL (ref 6–20)
BUN/CREAT SERPL: 11 (ref 7–25)
CALCIUM SERPL-MCNC: 8.4 MG/DL (ref 8.4–10.2)
CHLORIDE: 99 MMOL/L (ref 98–107)
CO2 SERPL-SCNC: 23 MMOL/L (ref 21–32)
CREAT SERPL-MCNC: 8.34 MG/DL (ref 0.67–1.17)
CRP SERPL-MCNC: 0.6 MG/DL
ERYTHROCYTE [SEDIMENTATION RATE] IN BLOOD BY WESTERGREN METHOD: 14 MM/HR (ref 0–20)
GLOBULIN SER-MCNC: 3.5 GM/DL (ref 2–4)
GLUCOSE SERPL-MCNC: 103 MG/DL (ref 65–99)
HBV SURFACE AG SER QL: NEGATIVE
LIPASE SERPL-CCNC: 188 UNIT/L (ref 73–393)
POTASSIUM SERPL-SCNC: 5 MMOL/L (ref 3.4–5.1)
PROT SERPL-MCNC: 6.5 GM/DL (ref 6.4–8.2)
PSA SERPL-MCNC: 0.95 NG/ML
SODIUM SERPL-SCNC: 136 MMOL/L (ref 135–145)
URATE SERPL-MCNC: 5.9 MG/DL (ref 3.5–7.2)

## 2018-08-19 ENCOUNTER — DIAGNOSTIC TRANS (OUTPATIENT)
Dept: OTHER | Age: 58
End: 2018-08-19

## 2018-08-19 LAB
ANALYZER ANC (IANC): ABNORMAL
ANION GAP SERPL CALC-SCNC: 15 MMOL/L (ref 10–20)
BASE DEFICIT BLDA-SCNC: 3 MMOL/L (ref 0–2)
BASE EXCESS BLDA CALC-SCNC: ABNORMAL MMOL/L
BASOPHILS # BLD: 0 THOUSAND/MCL (ref 0–0.3)
BASOPHILS NFR BLD: 0 %
BDY SITE: ABNORMAL
BODY TEMPERATURE: 37 DEGREES
BUN SERPL-MCNC: 63 MG/DL (ref 6–20)
BUN/CREAT SERPL: 10 (ref 7–25)
CALCIUM SERPL-MCNC: 8.3 MG/DL (ref 8.4–10.2)
CHLORIDE: 102 MMOL/L (ref 98–107)
CO2 SERPL-SCNC: 26 MMOL/L (ref 21–32)
CONDITION: ABNORMAL
CONDITION: ABNORMAL
CREAT SERPL-MCNC: 6.1 MG/DL (ref 0.67–1.17)
DIFFERENTIAL METHOD BLD: ABNORMAL
EOSINOPHIL # BLD: 0 THOUSAND/MCL (ref 0.1–0.5)
EOSINOPHIL NFR BLD: 1 %
ERYTHROCYTE [DISTWIDTH] IN BLOOD: 19.4 % (ref 11–15)
GLUCOSE SERPL-MCNC: 106 MG/DL (ref 65–99)
HCO3 BLDA-SCNC: 25 MMOL/L (ref 22–28)
HEMATOCRIT: 27.2 % (ref 39–51)
HGB BLD-MCNC: 8.6 GM/DL (ref 13–17)
HOROWITZ INDEX BLD+IHG-RTO: 21 %
LYMPHOCYTES # BLD: 0.6 THOUSAND/MCL (ref 1–4)
LYMPHOCYTES NFR BLD: 14 %
MCH RBC QN AUTO: 30.7 PG (ref 26–34)
MCHC RBC AUTO-ENTMCNC: 31.6 GM/DL (ref 32–36.5)
MCV RBC AUTO: 97.1 FL (ref 78–100)
MONOCYTES # BLD: 0.6 THOUSAND/MCL (ref 0.3–0.9)
MONOCYTES NFR BLD: 15 %
NEUTROPHILS # BLD: 2.9 THOUSAND/MCL (ref 1.8–7.7)
NEUTROPHILS NFR BLD: 70 %
NEUTS SEG NFR BLD: ABNORMAL %
NRBC (NRBCRE): ABNORMAL
PAO2 / FIO2 RATIO (RPFR): 414 (ref 300–500)
PCO2 BLDA: 53 MM HG (ref 35–48)
PH BLDA: 7.28 UNIT (ref 7.35–7.45)
PLATELET # BLD: 120 THOUSAND/MCL (ref 140–450)
PO2 BLDA: 87 MM HG (ref 83–108)
POTASSIUM SERPL-SCNC: 4.3 MMOL/L (ref 3.4–5.1)
RBC # BLD: 2.8 MILLION/MCL (ref 4.5–5.9)
SAO2 % BLDA: ABNORMAL %
SODIUM SERPL-SCNC: 139 MMOL/L (ref 135–145)
WBC # BLD: 4.1 THOUSAND/MCL (ref 4.2–11)

## 2018-08-31 PROBLEM — R77.8 ELEVATED TROPONIN: Status: ACTIVE | Noted: 2018-01-01
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
## 2018-08-31 PROBLEM — I50.9 ACUTE ON CHRONIC CONGESTIVE HEART FAILURE, UNSPECIFIED HEART FAILURE TYPE (HCC): Status: ACTIVE | Noted: 2018-01-01

## 2018-08-31 PROBLEM — I82.622: Status: ACTIVE | Noted: 2018-01-01

## 2018-08-31 PROBLEM — R94.31 ABNORMAL EKG: Status: ACTIVE | Noted: 2018-01-01

## 2018-08-31 PROBLEM — S09.90XA INJURY OF HEAD, INITIAL ENCOUNTER: Status: ACTIVE | Noted: 2018-01-01

## 2018-08-31 PROBLEM — J44.9 CHRONIC OBSTRUCTIVE PULMONARY DISEASE, UNSPECIFIED COPD TYPE (HCC): Status: ACTIVE | Noted: 2018-01-01

## 2018-08-31 PROBLEM — N18.9 CHRONIC RENAL FAILURE, UNSPECIFIED CKD STAGE: Status: ACTIVE | Noted: 2018-01-01

## 2018-08-31 NOTE — PLAN OF CARE
Dr. Marissa Marcos notified of blood pressure in 70s sbp after bolus. New orders taken. Albumin 25% 100cc given. Will continue to monitor.

## 2018-08-31 NOTE — H&P
[facility]  History and Physical     Yossi Dayna Patient Status:  Inpatient    1960 MRN [id number]   [facility] 7NE-A Attending Marcus Glynn MD   Hosp Day # 0 PCP [facility]     Chief Complaint: Status post fall and incre membranes. EOM-I. PERRLA. Anicteric. Neck: No lymphadenopathy. No JVD. No carotid bruits. Respiratory: Clear to auscultation bilaterally. No wheezes. No rhonchi. Cardiovascular: S1, S2. Regular rate and rhythm. No murmurs, rubs or gallops. Equal pulses. consistent with otomastoiditis to reevaluate clinically in the morning-patient is afebrile but he may benefit from antibiotic treatment if he is symptomatic otherwise.     Quality:  · DVT Prophylaxis: Lovenox  · CODE status: full  · Giron: no    Plan of car

## 2018-08-31 NOTE — PLAN OF CARE
Pt found hypotensive w/ bp in the 60s. Dr Nichole Pappas and Dr Hortencia Pantoja notified. New orders taken, 0.9  cc bolus started. Pt asymptomatic. Will continue to monitor.

## 2018-08-31 NOTE — PLAN OF CARE
NURSING ADMISSION NOTE    Pt admitted from ED, dx: fall, elevated troponin  Patient admitted via [address] to room. Safety precautions initiated. Bed in low position. Call light in reach. Pt lethargic, awaken to painful stimuli.   Vss, nsr pe

## 2018-08-31 NOTE — PHYSICAL THERAPY NOTE
Attempted PT evaluation, pt hypotensive and lethargic, not appropriate for PT assessment at this time. Will continue to follow.

## 2018-08-31 NOTE — PROGRESS NOTES
Hematology Brief note:    Full Consult to follow. Dialysis patient with an old AV fistula of the LUE presented s/p fall. LUE was noted to be swollen and a LUE doppler was performed:      FINDINGS:    EXTREMITY:  Left upper extremity.   THROMBI:  Bre Hanson

## 2018-08-31 NOTE — PROGRESS NOTES
[facility] Pharmacy Note:  Renal Dose Adjustment for Metoclopramide (REGLAN)    Tessa Etienne has been prescribed Metoclopramide (REGLAN) 10 mg every 8 hours as needed for nausea/vomiting.     Estimated Creatinine Clearance: 11 mL/min (A) (based on SCr of 7.35 mg

## 2018-08-31 NOTE — OCCUPATIONAL THERAPY NOTE
Attempted to see the patient for OT evaluation. Hypotension, RN in the room. OT will follow-up in the afternoon.

## 2018-08-31 NOTE — CONSULTS
[facility]  Report of Consultation    Josefina Palmer Patient Status:  Inpatient    1960 MRN [id number]   [facility] 7NE-A Attending Nica Flores MD   Hosp Day # 0 [place] [id number] [facility]     Reason for Consultation:  Abnormal troponin kg)  04/08/18 : 136 lb (61.7 kg)      Telemetry: NSR  General: Alert and oriented in no apparent distress. HEENT: No focal deficits. Neck: No JVD, carotids 2+ no bruits. Cardiac: Regular rate and rhythm, S1, S2 normal, no murmur, rub or gallop.   Lungs: Please call with any further questions.       Manuel Weaver  8/31/2018  8:44 AM

## 2018-08-31 NOTE — PLAN OF CARE
Results of ultrasound of LUE called to Dr. Dolores Gmoez. Instructed to notify Dr. Alysha Cervantes. Dr. Alysha Cervanets notified of results. No new orders at this time.

## 2018-08-31 NOTE — ED PROVIDER NOTES
Patient Seen in: [facility] Emergency Department    History   Patient presents with:  Fall (musculoskeletal, neurologic)    Stated Complaint: fall on lovenox     HPI    Patient is a 59-year-old male who presents the emergency department from nursing (Temporal)   Resp 13   Ht 188 cm (6' 2\")   Wt 71.7 kg   SpO2 99%   BMI 20.29 kg/m²         Physical Exam    Constitutional: Somnolent but awakens to voice and is oriented to person, place, and time, appears well-developed and well-nourished.    HENT:   Lottie Holliday Peptide 32,471 (*)     All other components within normal limits   RBC MORPHOLOGY SCAN - Abnormal; Notable for the following:     RBC Morphology See morphology below (*)     Macrocytosis Small (*)     All other components within normal limits   CBC W/ DIFF admitted to the hospital for further evaluation and treatment. Case discussed in detail with the admitting physician, who agreed to the emergency department management disposition of the patient.             Disposition and Plan     Clinical Impression:  F

## 2018-09-01 NOTE — RESPIRATORY THERAPY NOTE
Order for abg panel & lactate received approx 0800 and patient's arms examined for proper draw site. It appeared rt arm has a-v fistula and left arm has dvt's.  Dr. Oliva Carrel contacted about this and due to lack of quality arterial site on both arms , abg orde

## 2018-09-01 NOTE — PLAN OF CARE
Assumed care at 1900. Pt lethargic this evening, more awake over night. Oriented to person and place, disoriented to date and time. Follows commands. SBP 80's, pt asymptomatic.  On bedrest. Pt attempted to get out of bed but had difficulty d/t generalized w

## 2018-09-01 NOTE — PROGRESS NOTES
[facility]  Progress Note     Roddy Garcia Patient Status:  Inpatient    1960 MRN [id number]   [facility] 7NE-A Attending Serena Garcia MD   Hosp Day # 1 PCP [facility]     Chief Complaint: Syncope    S: Patient without new Agree with CTA if can be done, but has poor IV access. Might need V/Q scan. 2. Acute VTE:  Check LE ultrasound, IV heparin, warfarin. 3. Lethargy:  Check ABG, no meds to explain, baseline unclear  4. ESRD on HD  5.  Chronic COPD    Plan of care: As abo

## 2018-09-01 NOTE — CONSULTS
[facility]    Report of Consultation    Jackelin Camarillo Patient Status:  Inpatient    1960 MRN [id number]   [facility] 7NE-A Attending Loc Christine MD   Hosp Day # 1 [place] [id number] [facility]     Date of Admission:  2018  Date of Co (COLACE) cap 100 mg, 100 mg, Oral, BID  •  PEG 3350 (MIRALAX) powder packet 17 g, 17 g, Oral, Daily PRN  •  bisacodyl (DULCOLAX) rectal suppository 10 mg, 10 mg, Rectal, Daily PRN  •  heparin (PORCINE) drip [id number]/250mL infusion CONTINUOUS, 200-3,000 U thrombus seen within the left internal jugular vein and within the proximal aspect of the left   brachial vein    Impression and Plan:    Patient Active Problem List:     Fall     Fall, initial encounter     Elevated troponin     Abnormal EKG     Injury of

## 2018-09-01 NOTE — PLAN OF CARE
Pt returned from Ct angiogram, pt remains drowsy, oriented to self, [facility] called and informed of HD order. Pt chart further reviewed, Dr Blanca Catalan called with update, orders to follow.

## 2018-09-01 NOTE — PHYSICAL THERAPY NOTE
Attempt eval today, Pt on HD and has PE. Will hold PT eval today and follow up tomorrow.  Nursing is aware

## 2018-09-01 NOTE — CONSULTS
Pulmonary / Critical Care H&P/Consult       NAME: Angela Alvarenga: [id number] - MRN: [id number] - Age: 60year old - :  1960    Date of Admission: 2018  2:29 AM  Admission Diagnosis: Abnormal EKG [R94.31]  Elevated troponin [R74.8]  Injur smoker    Family History:  No family history on file. Home Medications:    Outpatient Prescriptions Marked as Taking for the 8/31/18 encounter [facility] Encounter):  acetaminophen 650 MG Rectal Suppos Place 650 mg rectally.  Disp:  Rfl:    AmLODIPine Be before breakfast. Disp:  Rfl:    Sertraline HCl 100 MG Oral Tab Take 100 mg by mouth nightly. Disp:  Rfl:    Calcium Carbonate Antacid 500 MG Oral Chew Tab Chew 1 tablet by mouth every 8 (eight) hours as needed for Heartburn.  Disp:  Rfl:        Scheduled M or deformity   Heart:    Regular rate and rhythm, S1 and S2 normal, III/VI sys murmur   Abdomen:     Soft, non-tender, bowel sounds active all four quadrants,     no masses, no organomegaly   Extremities:   2+ RLE edema   Pulses:   2+ and symmetric all ext findings seem c/w pulm edema. Pt denies infectious sx though he is not best historian. Normal wbc count. - for now monitor off abx  - as outpt will need f/u with PET scan vs repeat ct    Will follow. Thanks for the consult.          Aletha Ac M.D.

## 2018-09-01 NOTE — PROGRESS NOTES
[facility]  Nephrology Progress Note    Bria Headley Attending:  Eleni Rubio MD       Assessment and Plan:    1) ESRD- due to long-standing hypertension [facility]; very noncompliant and cuts his dialysis  08/31/2018   CO2 22.0 08/31/2018   GLU 80 08/31/2018   CA 8.9 08/31/2018   PTT 91.5 09/01/2018   TROP 0.101 08/31/2018       Imaging: All imaging studies reviewed.     Meds:     Current Facility-Administered Medications:  acetaminophen (TYLENOL) t

## 2018-09-02 PROBLEM — I46.9 PEA (PULSELESS ELECTRICAL ACTIVITY) (HCC): Status: ACTIVE | Noted: 2018-01-01

## 2018-09-02 PROBLEM — R00.1 BRADYCARDIA: Status: ACTIVE | Noted: 2018-01-01

## 2018-09-02 NOTE — PLAN OF CARE
Pt care assumed this am. Per rounding physicians, pt more alert and oriented to person and \"hospital\" this am. Pt alertness has waxed and waned all morning, but improved after HD for which he tolerated 3 hours of UF and 2 liters of removal. Pt had Ct sca

## 2018-09-02 NOTE — PROGRESS NOTES
[facility]    Progress Note    Mary Hernandez Patient Status:  Inpatient    1960 MRN [id number]   [facility] 6NE-A Attending Janusz Peck MD   Hosp Day # 2 PCP [facility]     Subjective:  Mary Hernandez is a(n) 60year old ma mucous plugging. Followup recommended. Cardiomegaly. Bilateral pleural effusions and bilateral subsegmental atelectasis. Diffuse sclerosis throughout the osseous structures. Medications reviewed.     Assessment and Plan:  Patient Active Problem

## 2018-09-02 NOTE — PLAN OF CARE
Pt received from 7619 after RRT called. Pt vented and biting ETT. Versed given. O2 sat 100% on 100% FIO2 via vent. Anesthesia at bedside to insert Zeina. ABG drawn and called to Dr. Mathieu Perez. Vent settings unchanged. FIO2 weaned per RT.  Will notify Nephr

## 2018-09-02 NOTE — PHYSICAL THERAPY NOTE
Patient transferred from [address] [place] [address] to CCU 9/2/18 @0328 d/t cardiac arrest.  Will require new PT/OT orders to eval and treat when medically appropriate.

## 2018-09-02 NOTE — PROGRESS NOTES
[facility]  Progress Note     Josefina Palmer Patient Status:  Inpatient    1960 MRN [id number]   [facility] 7NE-A Attending Javi Bowden MD   Hosp Day # 2 PCP [facility]     Chief Complaint: Syncope    S: Events overnight no 14. 6   INR  1.30*  1.10       Recent Labs   Lab  08/31/18   0329  08/31/18   1026   TROP  0.112*  0.101*            Imaging: Imaging data reviewed in Epic.     Medications:   • propofol       • docusate sodium  100 mg Oral BID   • Chlorhexidine Gluconate

## 2018-09-02 NOTE — PROGRESS NOTES
Assumed care at [facility]. Patient A&Ox1-2. Hep gtt per DVT/PE protocol. C/o generalized pain, PRN tylenol given. Approx 0030 patient restless and agitated. Attempting to get out of bed multiple times.  Offered toileting, snack/fluids, decreased stimuli, tequila

## 2018-09-02 NOTE — PROCEDURES
[facility]    Patients Name: Lorenzo Greene  Attending Physician: Eliza Patel MD  CSN: [id number]   Location:  [id number]/9425-R  MRN: [id number]    YOB: 1960  Admission Date: 8/31/2018     Intubation    Called to Intubate Patient.     Indic

## 2018-09-02 NOTE — PROGRESS NOTES
[facility] Patient Status:  Inpatient    1960 MRN [id number]   [facility] 6NE-A Attending Dixie Flores MD   Hosp Day # 2  [facility] / Critical Care Progress Note     S: Overnight events reviewed. edema       Recent Labs   Lab  08/31/18   1746  09/01/18   0806  09/02/18   0432   WBC  4.0  5.7  15.9*   HGB  9.5*  9.7*  9.2*   HCT  31.8*  32.2*  29.4*   PLT  126.0*  164.0  197.0     Recent Labs   Lab  08/31/18   0329  09/01/18   0806   INR  1.30*  1.1

## 2018-09-02 NOTE — PLAN OF CARE
@ 1400 propofol gtt stopped in hopes to work in weaning and extubating patient. VSS. Patient is not opening eyes or following commands like earlier. @ 1503, accucheck 56. 1 amp D50 given per hypoglycemic protocol. 15 minute recheck 142.  Dr. Merna Block notifi

## 2018-09-02 NOTE — PROCEDURES
Arterial Line:    Indication: continuous blood pressure monitoring in setting of cardiac arrest    Left arm cleaned with choloraprep. Wrist roll placed under left wrist to place in extension. Radial artery identified with ultrasound.   20gauge Arrow kit u

## 2018-09-02 NOTE — PROGRESS NOTES
[facility]  Nephrology Progress Note    Radha Mena Attending:  Bridger Sharpe MD       Assessment and Plan:    1) ESRD- due to long-standing hypertension [facility] q[facility]; very noncompliant and cuts his dialysis Medications:  propofol (DIPRIVAN) 10 MG/ML injection      fentaNYL citrate (SUBLIMAZE) 0.05 MG/ML injection 25 mcg 25 mcg Intravenous Q30 Min PRN   Or      fentaNYL citrate (SUBLIMAZE) 0.05 MG/ML injection 50 mcg 50 mcg Intravenous Q30 Min PRN   acetaminop

## 2018-09-02 NOTE — OCCUPATIONAL THERAPY NOTE
Patient transferred from [address] [place] [address] to CCU 9/2/18 @0328 d/t cardiac arrest.  Will require new PT/OT orders to eval and treat when medically appropriate.

## 2018-09-02 NOTE — CODE DOCUMENTATION
[facility]  CODE NOTE     Konrad Smiley Patient Status:  Inpatient    1960 MRN [id number]   [facility] 6NE-A Attending Timur Berman MD   Hosp Day # 2 PCP [facility]     Reason for Code Alert (narrative):  Worsening respira 1. Worsening respiratory failure-patient intubated and put on ventilator. 2. PEA bradycardia-patient given a dose of epinephrine and patient then had bradycardia so atropine was given and heart rate stabilized up into the high 70s low 80s  3.  Hypotens

## 2018-09-02 NOTE — PLAN OF CARE
CARDIOVASCULAR - ADULT    • Maintains optimal cardiac output and hemodynamic stability Progressing        HEMATOLOGIC - ADULT    • Maintains hematologic stability Progressing    • Free from bleeding injury Progressing        NEUROLOGICAL - ADULT    • Achie

## 2018-09-03 NOTE — PLAN OF CARE
CARDIOVASCULAR - ADULT    • Maintains optimal cardiac output and hemodynamic stability Progressing        Assumed care of pt at 1930. Pt drowsy and lethargic at start of shift, propofol off. Lung sounds diminished and coarse, on ventilator for support.  Mod

## 2018-09-03 NOTE — PHYSICAL THERAPY NOTE
PT attempted to see pt for an eval this am, however per RN Jesus Estrada, pt is still not appropriate as he remains intubated and unstable. PT will check in with RN staff on 9/4 to see if pt is medically stable for an evaluation.  Pt will require new orders when ap

## 2018-09-03 NOTE — PROGRESS NOTES
[facility]  Progress Note     Laura Morris Patient Status:  Inpatient    1960 MRN [id number]   [facility] 7NE-A Attending Mercedes Suarez MD   Hosp Day # 3 PCP [facility]     Chief Complaint: Syncope    S: awake following sim 09/03/18   0444   PTP  16.7*  14.6  20.6*   INR  1.30*  1.10  1.70*       Recent Labs   Lab  08/31/18   0329  08/31/18   1026   TROP  0.112*  0.101*            Imaging: Imaging data reviewed in Epic.     Medications:   • docusate sodium  100 mg Oral BID   •

## 2018-09-03 NOTE — PLAN OF CARE
GASTROINTESTINAL - ADULT    • Minimal or absence of nausea and vomiting Not Progressing          CARDIOVASCULAR - ADULT    • Maintains optimal cardiac output and hemodynamic stability Progressing        GASTROINTESTINAL - ADULT    • Maintains or returns to from sister and spouse. Updated on condition/POC. Vss. See assessment for complete details. Will continue to monitor.

## 2018-09-03 NOTE — PROGRESS NOTES
[facility]    Progress Note    Josefina Palmer Patient Status:  Inpatient    1960 MRN [id number]   [facility] 6NE-A Attending Javi Bowden MD   Hosp Day # 3 PCP [facility]     Subjective:  Josefina Palmer is a(n) 60year old ma lower lobe measuring 1.7 cm. More confluent patchy airspace disease involving the right lower lobe concerning for pneumonia with   possible underlying mucous plugging. Followup recommended. Cardiomegaly.   Bilateral pleural effusions and bilateral sub

## 2018-09-03 NOTE — PROGRESS NOTES
[facility] Pharmacy Note:  Renal Adjustment for meropenem (MERREM)    Faye Gan is a 60year old male who has been prescribed meropenem [facility] pharmacy to dose. CrCl is estimated creatinine clearance is 10.3 mL/min (A) (based on SCr of 7.9 mg/dL (H)).  and t

## 2018-09-03 NOTE — PROGRESS NOTES
[address] [facility] Patient Status:  Inpatient    1960 MRN [id number]   [facility] 6NE-A Attending Torin Dickinson MD   Hosp Day # 3  [employer] / Critical Care Progress Note     S: no events overnight      Sc 32.2*  29.4*  28.0*   PLT  164.0  197.0  195.0     Recent Labs   Lab  08/31/18   0329  09/01/18   0806  09/03/18   0444   INR  1.30*  1.10  1.70*         Recent Labs   Lab  08/31/18   1746  09/01/18   0806 09/03/18   0444   NA  136  137  137   K  4.5  4.4

## 2018-09-03 NOTE — PROGRESS NOTES
[facility]  Nephrology Progress Note    Radha Mena Attending:  Bridger Sharpe MD       Assessment and Plan:    1) ESRD- due to long-standing hypertension [facility] q[facility]; very noncompliant and cuts his dialysis 09/03/2018   PTT 56.2 09/03/2018   INR 1.70 09/03/2018   PTP 20.6 09/03/2018   MG 2.5 09/03/2018   PGLU 137 09/03/2018       Imaging: All imaging studies reviewed.     Meds:     Current Facility-Administered Medications:  Meropenem (MERREM) 500 mg in sodiu

## 2018-09-03 NOTE — DIETARY NOTE
[facility]    NUTRITION INITIAL ASSESSMENT    Pt is at moderate nutrition risk    Pt does not meet malnutrition criteria.     NUTRITION DIAGNOSIS/PROBLEM:    Inadequate energy intake related to inability to consume sufficient energy as evidenced by Fabrice kg)  Protein: 87-94 grams protein/day (1.2-1.3 grams protein per kg)  Fluid: ~1 ml/kcal or per MD discretion    MONITOR AND EVALUATE/NUTRITION GOALS:    3. No signs of skin breakdown  4. Maintain lean body mass  5.  Tolerance of enteral nutrition without si

## 2018-09-04 NOTE — PROGRESS NOTES
Becki Bah Patient Status:  Inpatient    1960 MRN [id number]   [facility] 6NE-A Attending Eliel Szymanski MD   Hosp Day # 4 PCP [facility]     Critical Care Progress Note      Assessment/Plan:  1.  Acute hypercapneic / hypoxic res meropenem  500 mg Intravenous Q24H   • docusate sodium  100 mg Oral BID   • Chlorhexidine Gluconate  15 mL Mouth/Throat [email or web address]   • Warfarin Sodium  5 mg Oral Nightly       Vent Mode: PRVC/AC  FiO2 (%):  [40 %] 40 %  S RR:  [20] 20  S VT:  [500 mL] 5 7.31*   PWHQHX8H  42   TSTMI5D  114*   ABGHCO3  20.5*   ABGBE  -5.5   TEMP  98.6   HIMANSHU  Not Applicable   SITE  Arterial Line   DEV  Servo Adult   THGB  9.1*     No results for input(s): BNP in the last 168 hours.   Recent Labs   Lab  08/31/18   0329  08/

## 2018-09-04 NOTE — PROGRESS NOTES
[facility]  Progress Note     Criselda Fluke Patient Status:  Inpatient    1960 MRN [id number]   [facility] 7NE-A Attending Lindsay Rhodes MD   Hosp Day # 4 PCP [facility]     Chief Complaint: Syncope    S: getting HD today. Estimated Creatinine Clearance: 9.9 mL/min (A) (based on SCr of 8.42 mg/dL (H)).     Recent Labs   Lab  09/01/18   0806  09/03/18   0444  09/04/18   0316   PTP  14.6  20.6*  22.3*   INR  1.10  1.70*  1.88*       Recent Labs   Lab  08/31/18   0329  08/

## 2018-09-04 NOTE — PROGRESS NOTES
[facility]    Progress Note    Ronal Marcano Patient Status:  Inpatient    1960 MRN [id number]   [facility] 6NE-A Attending Gaurav Artis MD   Hosp Day # 4 PCP [facility]     Subjective:  Ronal Marcano is a(n) 60year old ma on chronic congestive heart failure, unspecified heart failure type (HCC)     Chronic obstructive pulmonary disease, unspecified COPD type (HCC)     Chronic renal failure, unspecified CKD stage     ESRD (end stage renal disease) ([facility])     Anemia in chronic

## 2018-09-04 NOTE — PLAN OF CARE
CARDIOVASCULAR - ADULT    • Maintains optimal cardiac output and hemodynamic stability Progressing        Assumed care of pt at 1930. Pt drowsy, but able to follow commands. Propofol gtt for pt comfort. SR with frequent PAC's noted on the monitor.  Levo gtt

## 2018-09-04 NOTE — PROGRESS NOTES
[facility]  Nephrology Progress Note    Konrad Smiley Attending:  Timur Berman MD         Subjective:  Sedated, on vent  HD today; UF 2L       Current Facility-Administered Medications:  Metoclopramide HCl (REGLAN) injection 2.5 mg 2.5 mg Intraveno 99.3 °F (37.4 °C) (Temporal)   Resp 21   Ht 74\"   Wt 163 lb 5.8 oz   SpO2 96%   BMI 20.97 kg/m²   Temp (24hrs), Av °F (36.7 °C), Min:97.4 °F (36.3 °C), Max:98.3 °F (36.8 °C)       Intake/Output Summary (Last 24 hours) at 18 1179  Last data filed

## 2018-09-05 NOTE — PROGRESS NOTES
[facility]  Progress Note     Amna Amato Patient Status:  Inpatient    1960 MRN [id number]   [facility] 7NE-A Attending Inocente Homans, MD   Hosp Day # 5 PCP [facility]     Chief Complaint: Syncope    S: awake, tracking.   O interval not displayed. Estimated Creatinine Clearance: 9.7 mL/min (A) (based on SCr of 8.42 mg/dL (H)).     Recent Labs   Lab  09/03/18   0444  09/04/18   0316  09/05/18   0300   PTP  20.6*  22.3*  25.6*   INR  1.70*  1.88*  2.25*       Recent Labs

## 2018-09-05 NOTE — PLAN OF CARE
CARDIOVASCULAR - ADULT    • Maintains optimal cardiac output and hemodynamic stability Progressing        Assumed care of pt at 1930. Pt drowsy, but able to follow commands. Propofol gtt for pt comfort. Levo gtt titrated to keep SBP >90.  Heparin gtt titrat

## 2018-09-05 NOTE — PROGRESS NOTES
Vascular access RN entered room with RN Jimi Santiago. Jmii Santiago stated the patient has multiple  left upper extremity DVTs and right lower extremity DVT. Both extremities are very swollen with 3+ edema. Patient has a working AV fistula in the right arm.  A PIV is almanav

## 2018-09-05 NOTE — PROGRESS NOTES
[facility]  Nephrology Progress Note    Amirah Farley Attending:  Angeles Ortiz MD       Assessment and Plan:    1) ESRD- due to long-standing hypertension [facility] q[facility]; very noncompliant and cuts his dialysis (REGLAN) injection 2.5 mg 2.5 mg Intravenous Q8H   dextrose 5 % and 0.9 % NaCl infusion  Intravenous Continuous   Albumin Human (ALBUMINAR) 25 % solution 100 mL 100 mL Intravenous PRN   Meropenem (MERREM) 500 mg in sodium chloride 0.9% 100 mL  mg In

## 2018-09-05 NOTE — PROGRESS NOTES
[facility]    Progress Note    Becki Bah Patient Status:  Inpatient    1960 MRN [id number]   [facility] 6NE-A Attending Eliel Szymanski MD   Hosp Day # 5 PCP [facility]     Subjective:  Becki Bah is a(n) 60year old ma hospitalization, but he also has nodular opacities of the lungs. He is stable on heparin gtt. When tolerating PO, Start coumadin. Goal INR 2-3. If the lung findings do not represent malignancy, 3-6 months of anticoagulation will be recommended.  If maligna

## 2018-09-05 NOTE — PROGRESS NOTES
Kirsten Pino Patient Status:  Inpatient    1960 MRN [id number]   [facility] 6NE-A Attending Jane Lee MD   Hosp Day # [facility]     Critical Care Progress Note      Assessment/Plan:  1.  Acute hypercapneic / hypoxic res [10-15] 11    Intake/Output Summary (Last 24 hours) at 09/05/18 0809  Last data filed at 09/05/18 0600   Gross per 24 hour   Intake             1989 ml   Output             3000 ml   Net            -1011 ml       /74   Pulse 85   Temp 99.6 °F (37.6 ° all relevant chest imaging in PACS, agree with radiology interpretation except where noted.

## 2018-09-05 NOTE — PROGRESS NOTES
120 [facility] dosing service    Initial Pharmacokinetic Consult for Vancomycin Dosing     Ronal Marcano is a 60year old male nursing home resident who is being treated for pneumonia.   Pharmacy has been asked to dose Vancomycin by Dr. Kris Zavaleta for staph au dialysis session. 2.  Will obtain Vancomycin trough level(s) prior to 3rd HD if still on Vancomycin at that time. Goal trough level 15-20 ug/mL. 3.  Pharmacy will follow and monitor renal function changes, toxicity and efficacy.     Pharmacy will

## 2018-09-05 NOTE — CM/SW NOTE
09/05/18 1600   CM/SW Referral Data   Referral Source Social Work (self-referral)   Reason for Referral Discharge planning   Informant (patient's sister)   Patient Info   Patient's Mental Status (Presently on vent)   Patient's Home Environment Sub-acute

## 2018-09-06 NOTE — PROGRESS NOTES
[facility]    Progress Note    Jackelin Camarillo Patient Status:  Inpatient    1960 MRN [id number]   [facility] 6NE-A Attending Loc Christine MD   Hosp Day # 6 PCP [facility]     Subjective:  Jackelin Camarillo is a(n) 60year old ma (Pulseless electrical activity) (HCC)     Bradycardia     Acute deep vein thrombosis (DVT) of femoral vein of right lower extremity (HCC)    DVT and PE: Patient has LUE and RLE DVT, as well as bilateral PE.  Likely secondary to hospitalization, but he also

## 2018-09-06 NOTE — PLAN OF CARE
Extubated, Heparin gtt dc'd, Levo on during HD, then off.  Bipap, then nasal cannula.  ST to eval/tx      CARDIOVASCULAR - ADULT    • Maintains optimal cardiac output and hemodynamic stability Progressing          RESPIRATORY - ADULT    • Achieves optimal

## 2018-09-06 NOTE — PROGRESS NOTES
[facility]  Nephrology Progress Note    Amirah Farley Attending:  Angeles Ortiz MD       Assessment and Plan:    1) ESRD- due to long-standing hypertension [facility]; very noncompliant and cuts his dialysis BUN 56 09/06/2018    09/06/2018   K 3.7 09/06/2018    09/06/2018   CO2 24.0 09/06/2018   GLU 92 09/06/2018   CA 8.9 09/06/2018   PTT 97.4 09/06/2018   INR 3.15 09/06/2018   PTP 33.3 09/06/2018   PGLU 80 09/05/2018       Imaging:   All imaging Warfarin Sodium (COUMADIN) tab 5 mg 5 mg Oral Nightly   ondansetron HCl (ZOFRAN) injection 4 mg 4 mg Intravenous Q6H PRN   Metoclopramide HCl (REGLAN) injection 5 mg 5 mg Intravenous Q8H PRN   heparin (PORCINE) drip [id number]/250mL infusion CONTINUOUS 2

## 2018-09-06 NOTE — PROGRESS NOTES
0500 transferred from CCU. On the vent; alert and able to nod and shakes head appropriately. On Bilateral soft wrist restraints. On Heparin drip at 1600 units/hr IVF at 50cc/hr and Precedex at 0.2. NPO. NG to LIS draining moderate light brownish materials.  [facility]

## 2018-09-06 NOTE — PROGRESS NOTES
Maegan Rodriguez Patient Status:  Inpatient    1960 MRN [id number]   [facility] 4SW-A Attending Mirta Garcia MD   Hosp Day # 6 PCP [facility]     Critical Care Progress Note      Assessment/Plan:  1.  Acute hypercapneic / hypoxic res Vent Mode: PRVC/AC  FiO2 (%):  [40 %] 40 %  S RR:  [20] 20  S VT:  [500 mL] 500 mL  PEEP/CPAP (cm H2O):  [5 cm H20] 5 cm H20  MAP (cm H2O):  [11-13] 12    Intake/Output Summary (Last 24 hours) at 09/06/18 6068  Last data filed at 09/06/18 0500   Daysi Ann 168 hours. Recent Labs   Lab  08/31/18   0329  08/31/18   1026   TROP  0.112*  0.101*       Imaging: I independently visualized all relevant chest imaging in PACS, agree with radiology interpretation except where noted.

## 2018-09-06 NOTE — PROGRESS NOTES
[facility]  Progress Note     Maegan Rodriguez Patient Status:  Inpatient    1960 MRN [id number]   [facility] 7NE-A Attending Robert Jara MD   Hosp Day # 6 PCP [facility]     Chief Complaint: Syncope    S  Getting HD.   Awake to --     < > = values in this interval not displayed. Estimated Creatinine Clearance: 12.9 mL/min (A) (based on SCr of 6.29 mg/dL (H)).     Recent Labs   Lab  09/04/18   0316  09/05/18   0300  09/06/18   0518   PTP  22.3*  25.6*  33.3*   INR  1.88*  2.2

## 2018-09-06 NOTE — PLAN OF CARE
Pt care assumed at 0730 today, received on propofol 40 mcqs, he was deeply sedated. Rounds by pulmonary, instructed to attempt weaning parameters from ventilator. Discussed sedation with adam Henry'd use of precidex.  Pt was weaned  Down on propofol, i

## 2018-09-07 NOTE — CM/SW NOTE
.Care Coordination Rounds held 9/7/2018    Treatment team members present today include , , Charge Nurse, and nurse caring for Vesna Feliz    Other care providers present:    Patient Active Problem List:     Fall     Fall, initi

## 2018-09-07 NOTE — PROGRESS NOTES
[facility]  Progress Note     Kirsten Pino Patient Status:  Inpatient    1960 MRN [id number]   [facility] 7NE-A Attending Lopez Das MD   Hosp Day # 7 PCP [facility]     Chief Complaint: Syncope    S  Doing very well post Estimated Creatinine Clearance: 16.8 mL/min (A) (based on SCr of 4.67 mg/dL (H)).     Recent Labs   Lab  09/05/18   0300  09/06/18   0518  09/07/18   0519   PTP  25.6*  33.3*  65.0*   INR  2.25*  3.15*  7.41*       No results for input(s): TROP, CK in

## 2018-09-07 NOTE — PROGRESS NOTES
[facility]  Nephrology Progress Note    Gary Hercules Attending:  Elijah Coronado MD       Assessment and Plan:    1) ESRD- due to long-standing hypertension [facility] q[facility]; very noncompliant and cuts his dialysis  09/07/2018   K 3.6 09/07/2018    09/07/2018   CO2 28.0 09/07/2018   GLU 84 09/07/2018   CA 9.3 09/07/2018   INR 7.41 09/07/2018   PTP 65.0 09/07/2018   PGLU 87 09/06/2018       Imaging: All imaging studies reviewed.     Meds:     Current Fac

## 2018-09-07 NOTE — DIETARY NOTE
[facility]    NUTRITION INITIAL ASSESSMENT    Pt is at moderate nutrition risk    Pt does not meet malnutrition criteria.     NUTRITION DIAGNOSIS/PROBLEM:    Inadequate energy intake related to inability to consume sufficient energy as evidenced by Fabrice PRESCRIPTION:  Calories: 8245-2161 calories/day (25-30 calories per kg)  Protein: 87-94 grams protein/day (1.2-1.3 grams protein per kg)  Fluid: ~1 ml/kcal or per MD discretion    MONITOR AND EVALUATE/NUTRITION GOALS:    1. PO intake to meet at least 75% p

## 2018-09-07 NOTE — PHYSICAL THERAPY NOTE
Physical Therapy    Orders received to initiate PT, will hold today secondary to INR of 7.41. Will re-attempt eval tomorrow, as medical stability increases.

## 2018-09-07 NOTE — PROGRESS NOTES
Critical Care Progress Note        NAME: Yossi Mcintosh - ROOM: 165/317-X - MRN: [id number] - Age: 60year old - : 1960  Date of Admission: 2018  2:29 AM  Admission Diagnosis: Abnormal EKG [R94.31]  Elevated troponin [R74.8]  Injury of head, ini Medication:Calcium Carbonate Antacid, Albumin Human, acetaminophen **OR** acetaminophen **OR** acetaminophen, bisacodyl, dextrose, ondansetron HCl, Metoclopramide HCl     Lungs: clear to auscultation bilaterally  Heart: S1, S2 normal, no murmur, click, rub pneumonia and generalized weakness.   -resolving  -wean o2 as tolerated  -IS to bedside  2. Septic shock due to pneumonia  -improving  -s/p resuscitation with fluids - ESRD patient  -on Abx  3.  Pneumonia with extensive right sided infiltrate  -on meropenem

## 2018-09-07 NOTE — SLP NOTE
ADULT SWALLOWING EVALUATION    ASSESSMENT    ASSESSMENT/OVERALL IMPRESSION:  Patient seen for swallowing evaluation post intubation/extubation due to concern for aspiration.   Patient with no history of dysphagia per his report and evidenced by diet prescri vein thrombosis (DVT) of femoral vein of right lower extremity (HCC)    Coagulopathy (HCC)      Past Medical History  Past Medical History:   Diagnosis Date   • COPD (chronic obstructive pulmonary disease) ([facility] [address]    • Depression    • Essential hypertension #2 The patient/family/caregiver will demonstrate understanding and implementation of aspiration precautions and swallow strategies independently over 1-2 session(s).     In Progress     FOLLOW UP  Treatment Plan/Recommendations: Dysphagia therapy  Number of

## 2018-09-08 NOTE — PROGRESS NOTES
Pulmonary Progress Note      NAME: Christina Velasquez - ROOM: [facility]9363-N - MRN: [id number] - Age: 60year old - : 1960    Assessment/Plan:  1.  Acute hypercapneic / hypoxic resp failure: secondary to PE, pneumonia and generalized weakness.   -resolving MMM  Neck: Supple, no adenopathy or elevation in JVP  Cardiovascular: RRR, no murmurs or extra heart sounds   Respiratory: CTAB   GI: Soft, NTND, +normoactive BS   Ext: No cyanosis, +LUE edema    Recent Labs   Lab  09/08/18   0511   RBC  3.06*   HGB  9.1*

## 2018-09-08 NOTE — PHYSICAL THERAPY NOTE
Attempted to see pt for PT evaluation, however upon chart reviewed, INR found to be 5.39. Therefore, will hold PT today. Will attempt to see pt on 9/9/18.  CM

## 2018-09-08 NOTE — PROGRESS NOTES
[facility]    Progress Note    Allyssa Humphrey Patient Status:  Inpatient    1960 MRN [id number]   [facility] 6NE-A Attending Gagan Ballesteros MD   Hosp Day # 8 PCP [facility]     Subjective:  Allyssa Humphrey is a(n) 60year old ma opacities in both lungs. Surgical clips projecting along the right axillary region.     =====  CONCLUSION:  Increase in size of trace left pleural effusion. Otherwise no significant interval change.               Dictated by: Hamida Romo MD on 9/08/20 Intravenous Once in dialysis   [COMPLETED] sodium chloride 0.9% IV bolus 250 mL 250 mL Intravenous Once   [COMPLETED] Heparin Sodium (Porcine) 5000 UNIT/ML injection 2,150 Units 30 Units/kg Intravenous Once   [] Heparin Sodium (Porcine) 5000 UNIT/ML supratherapeutic INR. This is slowly coming down w/o any bleeding. Will resume coumadin when INR <3 and eating. If the lung findings do not represent malignancy, 3-6 months of anticoagulation will be recommended.  If malignancy is found, plan lifelong antic

## 2018-09-08 NOTE — PLAN OF CARE
HEMATOLOGIC - ADULT    • Free from bleeding injury Progressing        Patient/Family Goals    • Patient/Family Long Term Goal Progressing    • Patient/Family Short Term Goal Progressing        RESPIRATORY - ADULT    • Achieves optimal ventilation and oxyge

## 2018-09-08 NOTE — PLAN OF CARE
Assumed care 1545. Just finished HD. 2L off, tolerated well. Vitals stable. Eating a late lunch, good appetite. Rhythm appears irregular but unchanged from pt's baseline per tele. EKG done and does not show any changes from prior EKG and strips.  Will clos

## 2018-09-08 NOTE — PROGRESS NOTES
[facility]  Nephrology Progress Note    Edwinmichael Woods Attending:  Bailey Santiago MD       Subjective:  No acute issues  HD today    Current Facility-Administered Medications:  Calcium Carbonate Antacid (TUMS) chewable tab 1,000 mg 1,000 mg Oral BID P murmur or tub  Lungs: Decreased BS at bases bilaterally   Abdomen: Soft, non-tender. + bowel sounds, no palpable organomegaly  Extremities: Without clubbing, cyanosis; 1+ LE edema  Neurologic: Cranial nerves grossly intact, moving all extremities  Skin: Wa

## 2018-09-08 NOTE — PROGRESS NOTES
[facility]  Progress Note     Yossi Graver Patient Status:  Inpatient    1960 MRN [id number]   [facility] 7NE-A Attending Nelia Bhatt MD   Hosp Day # 8 PCP [facility]     Chief Complaint: Syncope    S  Says his cough and br for input(s): TROP, CK in the last 168 hours. Imaging: Imaging data reviewed in Epic.     Medications:   • epoetin leigh  10,000 Units Intravenous Once in dialysis   • pantoprazole (PROTONIX) IV push  40 mg Intravenous Daily   • Metoclopramide HCl  2

## 2018-09-08 NOTE — PLAN OF CARE
Pt and VS stable MD notified regarding INR 5.3 endorsed to 3p RN to f/u with ABT's and epogen.   Appears stable at time of this note

## 2018-09-08 NOTE — RESPIRATORY THERAPY NOTE
Pt tech called due to SpO2 decrease. Performed pt asesment. Pt placed on high flow NC 6L. SpO2 96%. BS Diminished bilaterally. Will continue to monitor and wean O2 as tolerated.

## 2018-09-09 NOTE — PHYSICAL THERAPY NOTE
PHYSICAL THERAPY EVALUATION - INPATIENT     Room Number: 6352/8830-M  Evaluation Date: 9/9/2018  Type of Evaluation: Initial  Physician Order: PT Eval and Treat    Presenting Problem: unwitnessed fall, c/o headache and dizziness  Reason for Therapy: history.     HOME SITUATION  Type of Home: Skilled nursing facility   Home Layout: One level  Stairs to Enter : 0     Stairs to Bedroom: 0       Lives With: Staff 24 hours  Drives: No  Patient Owned Equipment: Rolling walker(wheelchair)  Patient Regularly U A Little   -   Moving from lying on back to sitting on the side of the bed?: None   How much help from another person does the patient currently need. ..   -   Moving to and from a bed to a chair (including a wheelchair)?: A Little   -   Need to walk in hos training  Posture  ROM  Transfer training    Patient End of Session: Up in chair; With [address] staff;Call light within reach; Needs met;RN aware of session/findings; All patient questions and concerns addressed; Alarm set    ASSESSMENT   Patient is a 60year old ma assist device: walker - rolling at assistance level: supervision     Goal #4    Goal #5    Goal #6    Goal Comments: Goals established on 9/9/2018

## 2018-09-09 NOTE — PROGRESS NOTES
[facility]    Progress Note    Criselda Chavis Patient Status:  Inpatient    1960 MRN [id number]   [facility] 6NE-A Attending Lindsay Rhodes MD   Hosp Day # 9 PCP [facility]     Subjective:  Criselda Chavis is a(n) 60year old ma Metallic stent projecting in the right subclavian region. Calcified plaque in the   tortuous thoracic aorta.     =====  CONCLUSION:  Increasing focal consolidation in the mid to lower right lung is concerning for worsening pneumonia.   Clinical correlation injection      [COMPLETED] epoetin leigh (EPOGEN,PROCRIT) injection 10,000 Units 10,000 Units Intravenous Once in dialysis   [COMPLETED] sodium chloride 0.9% IV bolus 250 mL 250 mL Intravenous Once   [COMPLETED] Heparin Sodium (Porcine) 5000 UNIT/ML injecti hospitalization, but he also has nodular opacities of the lungs. He is stable on heparin gtt. Off coumadin given supratherapeutic INR. Will resume coumadin when INR <3 and eating.  If the lung findings do not represent malignancy, 3-6 months of anticoagulat

## 2018-09-09 NOTE — PROGRESS NOTES
[facility]  Progress Note     Dmitri Cavazos Patient Status:  Inpatient    1960 MRN [id number]   [facility] 7NE-A Attending Nikkie Nolen MD   Hosp Day # 9 PCP [facility]     Chief Complaint: Syncope    S  Still on high flow O2 TP  5.6*   --    --    --    --     < > = values in this interval not displayed. Estimated Creatinine Clearance: 20.4 mL/min (A) (based on SCr of 3.99 mg/dL (H)).     Recent Labs   Lab  09/07/18   0519  09/08/18   0511  09/09/18   0644   PTP  65.0*

## 2018-09-09 NOTE — PROGRESS NOTES
[facility]  Nephrology Progress Note    Tessa Etienne Attending:  Helen Boss MD       Subjective:  No acute issues  Complains of leg pain bilaterally    Current Facility-Administered Medications:  Calcium Carbonate Antacid (TUMS) chewable tab 1,0 bowel sounds, no palpable organomegaly  Extremities: Without clubbing, cyanosis; 1+ LE edema  Neurologic: Cranial nerves grossly intact, moving all extremities  Skin: Warm and dry, no rashes     Recent Labs      09/07/18   0519  09/07/18   0520  09/08/18

## 2018-09-09 NOTE — CM/SW NOTE
SW spoke w/pt's sister Deacon Gr stating she wanted to sign HCPOA paperwork and she was going to leave it in the room. Informed her the pt would need to be fully alert and oriented in order for SW to complete the paperwork.  Courtney stating the pt goes \"in and out

## 2018-09-09 NOTE — PLAN OF CARE
Problem: CARDIOVASCULAR - ADULT  Goal: Maintains optimal cardiac output and hemodynamic stability  INTERVENTIONS:  - Monitor vital signs, rhythm, and trends  - Monitor for bleeding, hypotension and signs of decreased cardiac output  - Evaluate effectivenes pressure on venipuncture sites to achieve adequate hemostasis  - Assess for signs and symptoms of internal bleeding  - Monitor lab trends   Outcome: Progressing      Problem: NEUROLOGICAL - ADULT  Goal: Achieves stable or improved neurological status  INTE Outcome: Progressing

## 2018-09-09 NOTE — PROGRESS NOTES
Assumed care of the patient at 1630. Patient is alert and oriented x4, but tired. NSR on tele. Oxygenation is 100% on 3 L NC L sounds diminished. VSS. Patient denies chest pain, shortness of breath, palpitations. Denies pain. LUE edema +2-3, BLE +2-3.  Patricia

## 2018-09-09 NOTE — OCCUPATIONAL THERAPY NOTE
OCCUPATIONAL THERAPY EVALUATION - INPATIENT     Room Number: 6061/1605-H  Evaluation Date: 9/9/2018  Type of Evaluation: Initial  Presenting Problem: COPD, fall    Physician Order: IP Consult to Occupational Therapy  Reason for Therapy: ADL/IADL Dysfunctio PROFILE    HOME SITUATION  Type of Home: Skilled nursing facility  Home Layout: One level  Lives With: Staff 24 hours               Occupation/Status: none  Hand Dominance: Right  Drives: No  Patient Regularly Uses: None    Prior Level of Function: Pt admi personal grooming such as brushing teeth?: A Lot  -   Eating meals?: A Lot    AM-PAC Score:  Score: 12  Approx Degree of Impairment: 66.57%  Standardized Score (AM-PAC Scale): 30.6  CMS Modifier (G-Code): CL    FUNCTIONAL TRANSFER ASSESSMENT  Supine to Sit deficits impacting engagement in ADL/IADL MODERATE  3 - 5 performance deficits   Client Assessment/Performance Deficits MODERATE - Comorbidities and min to mod modifications of tasks    Clinical Decision Making MODERATE - Analysis of occupational profile,

## 2018-09-09 NOTE — PROGRESS NOTES
Pulmonary Progress Note      NAME: Josefina Palmer - ROOM: [facility]8587- - MRN: [id number] - Age: 60year old - : 1960    Assessment/Plan:  1. Acute hypercapneic / hypoxic resp failure: secondary to PE, pneumonia and generalized weakness.    -wean o2 as appropriately, no focal deficits  Psych: Appropriate mood, appropriate affect  Recent Labs   Lab  09/09/18   0643   RBC  3.15*   HGB  9.5*   HCT  31.4*   MCV  99.7*   MCH  30.2   MCHC  30.3*   RDW  17.7*   NEPRELIM  6.00   WBC  7.4   PLT  125.0*     Recent

## 2018-09-10 NOTE — PROGRESS NOTES
[facility]  Nephrology Progress Note    Bettie Mcdonald Attending:  Sidra Keenan MD       Assessment and Plan:    1) ESRD- due to long-standing hypertension currently on dialysis at [facility] rehab center qTTS; very noncompliant and cuts his dialysis Medications:  Calcium Carbonate Antacid (TUMS) chewable tab 1,000 mg 1,000 mg Oral BID PRN   Pantoprazole Sodium (PROTONIX) 40 mg in Sodium Chloride 0.9 % 10 mL IV push 40 mg Intravenous Daily   Metoclopramide HCl (REGLAN) injection 2.5 mg 2.5 mg Intraveno

## 2018-09-10 NOTE — SLP NOTE
SPEECH DAILY NOTE - INPATIENT    ASSESSMENT & PLAN   ASSESSMENT  Pt seen for dysphagia tx to assess tolerance with recommended diet, ensure proper utilization of aspiration precautions and provide pt/family education.   Patient alert and up in bed with O2 v

## 2018-09-10 NOTE — PROGRESS NOTES
[facility]  Progress Note     Johnbrooke Bah Patient Status:  Inpatient    1960 MRN [id number]   [facility] 7NE-A Attending Cele Hayden MD   Hosp Day # 8 PCP [facility]     Chief Complaint: Syncope    S:  Patient confused an 15*  12*   CA  9.0   < >  8.9  8.9  8.8   ALB  2.3*   --    --    --    --    NA  137   < >  143  141  139   K  4.4   < >  3.8  3.8  3.8   CL  100*   < >  105  103  102   CO2  21.0*   < >  26.0  26.0  28.0   ALKPHO  92   --    --    --    --    AST  17   - Full Code    Estimated date of discharge: Tomorrow if improving, PT recs - [facility]    Plan of care discussed with RN. More confused than when I saw him last Friday. Needs to wear O2 and continue to encourage compliance.      Jah LANG  12:19 PM     H

## 2018-09-10 NOTE — PLAN OF CARE
CARDIOVASCULAR - ADULT    • Maintains optimal cardiac output and hemodynamic stability Progressing        GASTROINTESTINAL - ADULT    • Minimal or absence of nausea and vomiting Progressing    • Maintains or returns to baseline bowel function Progressing Will continue to monitor.

## 2018-09-10 NOTE — PROGRESS NOTES
[address] [facility] Patient Status:  Inpatient    1960 MRN [id number]   [facility] 2NE-A Attending Mark Smallwood DO   Hosp Day # 10 PCP [facility]     SUBJECTIVE: Pt states breathing is \"ok as long as I don't do anythi erythema or exudates, moist mucous membranes                          Lungs: coarse BS with scattered wheeze noted                          Chest wall: No tenderness or deformity.                           LWUEP: [id number]  Gilles Walker where noted. ASSESSMENT/PLAN:  1.  Acute hypercapneic / hypoxic resp failure: secondary to PE, pneumonia and generalized weakness, also suspect some volume overload contributing.   -wean o2 as tolerated  -IS, pulmonary toilet  -fluid removal with HD a

## 2018-09-10 NOTE — PROGRESS NOTES
[facility]    Progress Note    Allyssa Humphrey Patient Status:  Inpatient    1960 MRN [id number]   [facility] 6NE-A Attending Gagan Ballesteros MD   Hosp Day # 10 PCP [facility]     Subjective:  Allyssa Humphrey is a(n) 60year old m (DVT) of femoral vein of right lower extremity (HCC)     Coagulopathy (HCC)     Acute respiratory failure with hypoxia and hypercapnia (HCC)     Aspiration pneumonia (HCC)     Thrombocytopenia (HCC)    DVT and PE: Patient has LUE and RLE DVT, as well as bi

## 2018-09-10 NOTE — CM/SW NOTE
Patient discussed in rounds, patient remains not fully oriented, will hold off HCPOA paperwork at this time. RN to call SW, CM or spiritual care to assist in [facility] paperwork when patient is appropriate to sign.     Chastity Carranza RN,   Phone [phone]

## 2018-09-11 NOTE — PROGRESS NOTES
I called lula HOANG at [phone] spoke w/ YAMILKA jansen and he said  They do manage cpumadin and capable of monitoring inr; patient seen/examined by cj dove and ok for discharge today  Dr. John Lara will come and see patient   theo goncalves notified w/ plan

## 2018-09-11 NOTE — PROGRESS NOTES
[facility]    Progress Note    Kale Nowak Patient Status:  Inpatient    1960 MRN [id number]   [facility] 6NE-A Attending Torin Dickinson MD   Hosp Day # 6 PCP [facility]     Subjective:  Kale Nowak is a(n) 60year old m embolus (HCC)     PEA (Pulseless electrical activity) (HCC)     Bradycardia     Acute deep vein thrombosis (DVT) of femoral vein of right lower extremity (HCC)     Coagulopathy (HCC)     Acute respiratory failure with hypoxia and hypercapnia (HCC)     Aspi

## 2018-09-11 NOTE — PROGRESS NOTES
[facility]  Nephrology Progress Note    Criselda Chavis Attending:  Lindsay Rhodes MD       Assessment and Plan:    1) ESRD- due to long-standing hypertension currently on dialysis at [facility] qTTS; very noncompliant and cuts his dialysis  09/11/2018    CO2 27.0 09/11/2018    GLU 83 09/11/2018    CA 8.8 09/11/2018    INR 2.72 09/11/2018    PTP 29.7 09/11/2018       Imaging: All imaging studies reviewed.     Meds:     Current Facility-Administered Medications:  Calcium Carbonate Ant

## 2018-09-11 NOTE — PROGRESS NOTES
[facility] Patient Status:  Inpatient    1960 MRN [id number]   [facility] 2NE-A Attending Franchesca Jackson, DO   Hosp Day # 6 PCP [facility]     SUBJECTIVE:no new events. Better overall.       OBJECTIVE:  /75 WBC 4.7 09/11/2018    RBC 2.96 09/11/2018    HGB 8.8 09/11/2018    HCT 29.0 09/11/2018    MCV 98.0 09/11/2018    MCH 29.7 09/11/2018    MCHC 30.3 09/11/2018    RDW 17.0 09/11/2018    .0 09/11/2018     Lab Results   Component Value Date

## 2018-09-11 NOTE — PROGRESS NOTES
NURSING DISCHARGE NOTE    Discharged Nursing home via Ambulance. Accompanied by Support staff  Belongings Returned to patient from safe. Report given to [facility], RN at [facility].

## 2018-09-11 NOTE — CM/SW NOTE
09/11/18 1400   Discharge disposition   Expected discharge disposition Skilled Nurs   Name of [facility]   Discharge transportation [facility]   pt's sister aware of dc.  SW was asking about transferring the pt to [facility]

## 2018-09-11 NOTE — PLAN OF CARE
Problem: CARDIOVASCULAR - ADULT  Goal: Maintains optimal cardiac output and hemodynamic stability  INTERVENTIONS:  - Monitor vital signs, rhythm, and trends  - Monitor for bleeding, hypotension and signs of decreased cardiac output  - Evaluate effectivenes interventions as ordered   Outcome: Progressing  A/o x 4 follows commands    Problem: METABOLIC/FLUID AND ELECTROLYTES - ADULT  Goal: Electrolytes maintained within normal limits  INTERVENTIONS:  - Monitor labs and rhythm and assess patient for signs and s

## 2018-09-11 NOTE — PROGRESS NOTES
[facility]  Progress Note     Vesna Feliz Patient Status:  Inpatient    1960 MRN [id number]   [facility] 7NE-A Attending Dr. Arpita Nicole Day # 6 PCP [facility]     Chief Complaint: Syncope    S:  Patient feeding himself 4.33*  3.90*  2.72*       No results for input(s): TROP, CK in the last 168 hours. Imaging: Imaging data reviewed in Epic.     Medications:   • pantoprazole (PROTONIX) IV push  40 mg Intravenous Daily   • Metoclopramide HCl  2.5 mg Intravenous Q8H

## 2018-09-11 NOTE — DISCHARGE SUMMARY
[facility]OSPITAL[facility]  DISCHARGE SUMMARY     Gary Hercules Patient Status:  Inpatient    1960 MRN [id number]   [facility] 2NE-A Attending Francisco Hancock, [address] [facility] Day # 6 [facility] [address] [facility]     Date of Admission: 2018  Date of Dischar Joe Orlando is a 60year old male with history of COPD hypertension and depression history of end-stage renal disease on hemodialysis who was transferred to emergency room from the nursing home after he suffered a fall that was not witnessed by the staf shock from sedation. He developed pneumonia and sputum culture grew MSSA, ESBL E. coli. He continued on meropenem IV. Continue to attempt ventilator weaning. Patient was awake alert and following commands.   He was extubated once a volume and pneumonia daily.    Refills:  0     calciTRIOL 0.25 MCG Caps  Commonly known as:  ROCALTROL      0.25 mcg = 1 cap, Oral, Daily, Cap, Maintenance   Refills:  0     Calcium Carbonate Antacid 500 MG Chew  Commonly known as:  TUMS      Chew 1 tablet by mouth every 8 (eig DELTASONE        predniSONE 20 MG Tabs  Commonly known as:  DELTASONE        RISPERDAL 0.5 MG Tabs  Generic drug:  risperiDONE              Where to Get Your Medications      Please  your prescriptions at the location directed by your doctor or nurs

## 2018-09-11 NOTE — PLAN OF CARE
Impaired Swallowing    • Minimize aspiration risk Adequate for Discharge          CARDIOVASCULAR - ADULT    • Maintains optimal cardiac output and hemodynamic stability Progressing        GASTROINTESTINAL - ADULT    • Minimal or absence of nausea and vomit position. Call light within reach. Nonskid socks in place. Needs are met. Will continue to monitor.

## 2018-09-16 PROBLEM — E16.2 HYPOGLYCEMIA: Status: ACTIVE | Noted: 2018-01-01

## 2018-09-16 PROBLEM — R40.0 SOMNOLENCE: Status: ACTIVE | Noted: 2018-01-01

## 2018-09-16 PROBLEM — D64.9 ANEMIA: Status: ACTIVE | Noted: 2018-01-01

## 2018-09-16 PROBLEM — G92.8 TOXIC METABOLIC ENCEPHALOPATHY: Status: ACTIVE | Noted: 2018-01-01

## 2018-09-16 PROBLEM — G93.40 ENCEPHALOPATHY ACUTE: Status: ACTIVE | Noted: 2018-01-01

## 2018-09-16 PROBLEM — N18.6 ANEMIA IN ESRD (END-STAGE RENAL DISEASE) (HCC): Status: ACTIVE | Noted: 2018-01-01

## 2018-09-16 PROBLEM — D63.1 ANEMIA IN ESRD (END-STAGE RENAL DISEASE) (HCC): Status: ACTIVE | Noted: 2018-01-01

## 2018-09-16 PROBLEM — J18.9 PNEUMONIA OF BOTH UPPER LOBES DUE TO INFECTIOUS ORGANISM: Status: ACTIVE | Noted: 2018-01-01

## 2018-09-16 NOTE — PROGRESS NOTES
[facility] Pharmacy Note:  Renal Adjustment for piperacillin/tazobactam (Luellen Comp)    Gibson Coleman is a 60year old male who has been prescribed piperacillin/tazobactam (ZOSYN) 3.375 gm every 8 hrs.   CrCl is estimated creatinine clearance is 17.6 mL/min (A) (based

## 2018-09-16 NOTE — ED PROVIDER NOTES
Patient Seen in: [facility] Emergency Department    History   Patient presents with:  Stroke (neurologic)    Stated Complaint: stroke    HPI    50-year-old -American male presents to the emergency department by ambulance from [facility] SpO2 97%   BMI 24.14 kg/m²         Physical Exam    Chronically ill -American man lying on emergency department bed. He is asleep his mouth is open his tongue is slightly protruding from his mouth.   Upon transferring him from the EMS stretcher to HGB 8.5 (*)     HCT 29.5 (*)     .1 (*)     MCHC 28.8 (*)     RDW 16.8 (*)     RDW-SD 64.5 (*)     Lymphocyte Absolute 0.50 (*)     All other components within normal limits   TROPONIN I - Normal   POCT GLUCOSE - Normal   CBC WITH DIFFERENTIAL WI jugular vein vascular stents. CTA Head:  The Elim IRA of Crow is patent without aneurysm or occlusion. Cerebral venous sinuses are patent. CT perfusion:  No focal asymmetry on cerebral blood flow for MTT maps. No large vessel occlusion.     CBF less provider specified.       Medications Prescribed:  Current Discharge Medication List        Present on Admission  Date Reviewed: 9/6/2018          ICD-10-CM Noted POA    * (Principal) Encephalopathy acute G93.40 9/16/2018 Unknown    Anemia D64.9 9/16/2018 Y

## 2018-09-16 NOTE — PROGRESS NOTES
Stroke Navigator Note:      Called to A3/A3  for Code Stroke Peyton paged at [address]  Arrived to dept at [facility]  Upon arrival found patient to be very drowsy and difficult to arouse.   Pt is chronically ill, nursing home resident, wheelchair bound, recently admi

## 2018-09-16 NOTE — ED NOTES
Pt up to floor with transport. Clean and dry upon departure from ED.  Pt drowsy but arousable upon departure from ED

## 2018-09-16 NOTE — ED INITIAL ASSESSMENT (HPI)
Pt presents to ED via EMS from NH with complaint of stroke. Per EMS patient returned to NH from dialysis around 3pm and \"went to sleep in the chair\" EMS report staff at [facility] thought patient was sleeping.  Went into room around 130am and noticed his tongue w

## 2018-09-16 NOTE — PROGRESS NOTES
MRI screening form reviewed with patients sister Archana King  Per Archana King, she is patients POA and has paperwork.  Asked to bring in

## 2018-09-16 NOTE — CONSULTS
[facility]  Report of Consultation    Gary Hercules Patient Status:  Inpatient    1960 MRN [id number]   [facility] 7NE-A Attending Francisco Hancock, DO   Hosp Day # 0 PCP [facility]     Reason for Consultation:  AMS/ESRD    History 2 mg, 2 mg, Oral, Nightly  •  Metoclopramide HCl (REGLAN) injection 5 mg, 5 mg, Intravenous, Q8H PRN    No current outpatient medications on file.     Review of Systems:  Pt cannot provide details    Physical Exam:   /60 (BP Location: Left arm)   Puls 09/16/2018    AST 10 09/16/2018    ALT 8 09/16/2018    PTT 68.0 09/16/2018    INR 2.05 09/16/2018    PTP 23.8 09/16/2018    T4F 0.8 09/16/2018    TSH 7.640 09/16/2018    TROP <0.046 09/16/2018    B12 1,085 09/16/2018    PGLU 83 09/16/2018       BUN (mg/dL)

## 2018-09-16 NOTE — CONSULTS
[facility]    Report of Consultation    Ysabel Landis Patient Status:  Inpatient    1960 MRN [id number]   [facility] 7[facility]A Attending Raquel Conklin, DO   Hosp Day # 0 PCP [facility]     Date of Admission:  2018  Date of Co azithromycin (ZITHROMAX) 500 mg in sodium chloride 0.9 % 250 mL IVPB, 500 mg, Intravenous, Q24H  •  Piperacillin Sod-Tazobactam So (ZOSYN) 3.375 g in dextrose 5 % 100 mL ADD-vantage, 3.375 g, Intravenous, Q12H  •  acetaminophen (TYLENOL) tab 650 mg, 650 mg origin of bilateral vertebral arteries, no other significant stenosis, possible pneumonia  NH3 30    Assessment  Patient Active Problem List:     Fall     Fall, initial encounter     Elevated troponin     Abnormal EKG     Injury of head, initial encounter evaluation of your patient,    Cherrie Mayes DO  Neuromuscular and General Neurology  [facility]   pager [phone]

## 2018-09-16 NOTE — SLP NOTE
Orders received for SLP evaluation. Attempted to see pt for bedside swallow evaluation, however pt was very lethargic and unable to follow commands, and is therefore not appropriate for PO trials at this time. SLP will re-attempt 9/17/18 as able.    Marie Maki

## 2018-09-16 NOTE — PROGRESS NOTES
[facility] Pharmacy Note:  Renal Dose Adjustment for Metoclopramide (REGLAN)    Radha Mena has been prescribed Metoclopramide (REGLAN) 10 mg every 8 hours as needed for nausea/ vomiting.     Estimated Creatinine Clearance: 17.6 mL/min (A) (based on SCr of 4.37

## 2018-09-16 NOTE — PROGRESS NOTES
[facility] Follow-up Note    Patient seen and examined and agree with plan outlined by Dr. Keila Kincaid this morning. Etiology of encephalopathy unclear. CT head negative. Ammonia WNL. Mildly hypothyroid but likely not responsive for symptoms.  ? Related to

## 2018-09-16 NOTE — PLAN OF CARE
Pt admitted at 0400. Safety precautions initiated. Bed in low position. Bed alarm placed. Pt drowsy. [facility] 22. Hospitalist paged. Azithromycin and Vanco given. Will continue to monitor.

## 2018-09-16 NOTE — PROGRESS NOTES
Dr Dan Lat notified of recent BP 89/49. Orders received and carried out.  Will continue to closely monitor

## 2018-09-16 NOTE — CM/SW NOTE
MSW spoke to staff at [facility] and patient is current resident there. Patient has medicaid. MSW called both #'s listed for wife ([phone]; [phone]) no messages left.

## 2018-09-16 NOTE — H&P
[facility]  History and Physical     Josefina Palmer Patient Status:  Inpatient    1960 MRN [id number]   [facility] 7NE-A Attending Richard Parsons MD   Hosp Day # 0 PCP [facility]     Chief Complaint: confusion, drowsy calciTRIOL 0.25 MCG Oral Cap 0.25 mcg = 1 cap, Oral, Daily, Cap, Maintenance Disp:  Rfl:    ipratropium-albuterol 0.5-2.5 (3) MG/3ML Inhalation Solution 3 mL every 6 (six) hours as needed. Disp:  Rfl:    famoTIDine 20 MG Oral Tab Take 20 mg by mouth.  D commands  Musculoskeletal: does not follow commands to move extremities  Extremities: No edema or cyanosis. Integument: No rashes or lesions.    Psychiatric: drowsy      Diagnostic Data:      Labs:  Recent Labs   Lab  09/09/18   8311  09/09/18   0644  09/1 Pastor Grullon MD  4/64/6815        **Certification      PHYSICIAN Certification of Need for Inpatient Hospitalization - Initial Certification    Patient will require inpatient services that will reasonably be expected to span two midnight's based on the clin

## 2018-09-16 NOTE — PROGRESS NOTES
Assumed care of patient at 0730  Oriented to person, drowsy- sometimes only responds to painful stimuli.  Too drowsy to be seen by ST or take PO meds  4LNC, NSR on tele  Generalized pitting edema  Bedrest  Isolation precautions maintained per protocol  Will

## 2018-09-17 PROBLEM — G93.40 ACUTE ENCEPHALOPATHY: Status: ACTIVE | Noted: 2018-01-01

## 2018-09-17 NOTE — PROGRESS NOTES
Assumed care of patient at 0730  AOx1 (person), 4LNC, NSR on tele  Patient confused, does not always follow commands, difficult to perform full assessment  Cleared by ST for pureed, thin- 1:1 assist with meals, poor appetite  IVF d/c'd per Dr Evert Choudhury order  H

## 2018-09-17 NOTE — CM/SW NOTE
This pt is a LTC resident at [facility] [phone]. There has been some question about HCPOA/surrogate decision maker. CM called [facility] and they confirmed his LTC status.  They also faxed a copy of HCPOA signed by the pt, and witnessed on 9/13/18 b

## 2018-09-17 NOTE — CM/SW NOTE
This pt is a LTC resident at [facility] [phone]. Per rounds and chart review, there has been some question about HCPOA/surrogate decision maker status. CM called [facility] and they confirmed his LTC status.  They also faxed a copy of HCPOA signed b

## 2018-09-17 NOTE — PROGRESS NOTES
[facility]  Nephrology Progress Note    Laura Morris Attending:  Cassandra Ravi DO       Assessment and Plan:    1) ESRD- due to long-standing hypertension currently on dialysis at [facility] rehab center qTTS; very noncompliant and cuts his dialysis 200-25 MCG/INH inhaler 1 puff 1 puff Inhalation Daily   calciTRIOL (ROCALTROL) cap 0.5 mcg 0.5 mcg Oral Daily   famoTIDine (PEPCID) tab 20 mg 20 mg Oral Daily   folic acid (FOLVITE) tab 1 mg 1 mg Oral Daily   levOCARNitine (CARNITOR) 1 GM/10ML solution 990

## 2018-09-17 NOTE — PROGRESS NOTES
[id number] [facility] Neurology Progress Note    Yossi Mcintosh Patient Status:  Inpatient    1960 MRN [id number]   [facility] 7NE-A Attending Mark Smallwood, [address] [facility] Day # 1 PCP [address]           Neurology Attending note     I hav Call us with questions or concerns        Dana Everett MD   [facility]             Subjective:  Maryann Ramirez is a(n) 60year old male with history of ESRD on HD , hypertension, COPD who was admitted for decreased responsiveness a Patient Active Problem List:     Fall     Fall, initial encounter     Elevated troponin     Abnormal EKG     Injury of head, initial encounter     Deep vein thrombosis (DVT) of left upper extremity, unspecified chronicity, unspecified vein ([facility] [address]

## 2018-09-17 NOTE — SLP NOTE
ADULT SWALLOWING EVALUATION    ASSESSMENT    ASSESSMENT/OVERALL IMPRESSION:  Patient seen for swallowing evaluation as he exhibited slurred speech and facial droop on admission. Patient known to this department from recent admission.   Recommendation at th obstructive pulmonary disease) (HCC)    • Depression    • Essential hypertension    • Renal disorder        Prior Living Situation: Skilled nursing facility     Precautions: Aspiration    Patient/Family Goals: none stated    SWALLOWING HISTORY  Current Die Phase of Swallow: Impaired  Laryngeal Elevation Timing: Appears impaired(suspect r/t to cognition)  Laryngeal Elevation Strength: Appears intact  Laryngeal Elevation Coordination: Appears intact  (Please note: Silent aspiration cannot be evaluated clinical

## 2018-09-17 NOTE — CONSULTS
[facility]  Report of Psychiatric Consultation    Yossi Mcintosh Patient Status:  Inpatient    1960 MRN [id number]   [facility] 7NE-A Attending Mark Smallwood, [address] [facility] [address] Day # 1 PCP [address]     Date of Admission: 18  Date has been  for almost 8 yrs.  Per wife, he has been depressed on and off, due to his health issues, but has NO hx of psychosis with hallucinations, unless he is in the hospital. He had a hx of crack cocaine addiction per wife and alcohol use disorder past. He has been on disability since starting dialysis 13-14 yrs ago. He has been living at [facility], but was living at home with his wife before 4/2018.     Past Medical History:   Diagnosis Date   • COPD (chronic obstructive pulmonary disease) ([facility] [address]    • Min PRN **OR** glucose (DEX4) oral liquid 30 g, 30 g, Oral, Q15 Min PRN **OR** Glucose-Vitamin C (DEX-4) 4-0.006 g chewable tab 8 tablet, 8 tablet, Oral, Q15 Min PRN    Review of Systems   Constitutional: Positive for malaise/fatigue.      Mental Status Exa fluid collection identified. Minimal FLAIR signal abnormalities in the subcortical and deep periventricular white matter along with the right dentate nucleus is noted. No significant abnormal parenchymal gradient susceptibility.        There is no

## 2018-09-17 NOTE — PROCEDURES
ELECTROENCEPHALOGRAM REPORT      Patient Name: Maegan Rodriguez  Chart ID: [id number]  Ordering Physician: Dr Candee Eisenmenger Date of Test: 9/17/2018  Patient Diagnosis: AMS      HISTORY  A 60year old male with history of ESRD on HD, COPD, who had just received HD,

## 2018-09-17 NOTE — PROGRESS NOTES
[facility]  Progress Note     Becki Bah Patient Status:  Inpatient    1960 MRN [id number]   [facility] 7NE-A Attending Maurice Junior, [address] [facility] [address] Day # 1 PCP [facility]     Chief Complaint: AMS    S: Patient confused, repea 9/18/2018] epoetin leigh  10,000 Units Intravenous Once in dialysis   • atorvastatin  80 mg Oral Nightly   • Fluticasone Furoate-Vilanterol  1 puff Inhalation Daily   • calciTRIOL  0.5 mcg Oral Daily   • famoTIDine  20 mg Oral Daily   • folic acid  1 mg Ora

## 2018-09-17 NOTE — PLAN OF CARE
Pt conversating and alert to name and place at 2am.  Speech clear but delayed. Rhonchi on auscultation and diminished in bases. Afebrile. Better BP control with /50s.       NEUROLOGICAL - ADULT    • Achieves stable or improved neurological status P

## 2018-09-17 NOTE — PROGRESS NOTES
Patient refusing PO meds, able to give patient scheduled Abilify with ice cream after much encouragement but refuses anything else. Left hand IV occluded, removed.  Staff have tried multiple times to place new PIV- patient continues to refuse and will not l

## 2018-09-18 NOTE — PLAN OF CARE
Blood sugar resulted at 70 this AM, MD aware, protocol initiated, see MAR. Recheck blood sugar was 89. Blood sugars continued to be monitored per protocol. Patient encouraged to eat and drink.

## 2018-09-18 NOTE — PLAN OF CARE
Assumed patient care 0730. Patient alert/orientated to person, opens eyes to stimulation. Pt with confusion, does not always follow commands. NSR per tele. Diet pureed with thin liquids, 1:1 assist with meals. Poor appetite during shift.  Refusing some meds

## 2018-09-18 NOTE — PROGRESS NOTES
[facility]  Report of Psychiatric Progress Note    Date of Admission: 9/16/18  Date of Service: 9/18/18  Reason for Consultation: Altered mental status     Impression:  Primary Psychiatric Diagnosis:  Delirium/encephalopathy, likely due to sedating me intermittent hallucinations (visions and voices) per wife and sister the past 6 months. He has also been repeatedly hospitalized and required nursing home care.  No hx of schizophrenia or a primary psychotic disorder before.      During this admission, he h with his wife before 4/2018.          Past Medical History:   Diagnosis Date   • COPD (chronic obstructive pulmonary disease) ([facility] [address]     • Depression     • Essential hypertension     • Renal disorder        History reviewed. No pertinent surgical history. Component Value Date     [facility] 101 09/17/2018         STUDIES:     9/16/18  PROCEDURE:  MRI BRAIN (CPT=70551)     COMPARISON:  EDSHEILA , CT STROKE BRAIN (NO IV)(CPT=70450), 9/16/2018, 2:08.     INDICATIONS:  decreased responsiveness     TECHNIQUE:  MRI of

## 2018-09-18 NOTE — PROGRESS NOTES
[facility]  Nephrology Progress Note    Flory Sun Attending:  Franchesca Jackson DO       Assessment and Plan:    1) ESRD- due to long-standing hypertension [facility] q[facility]S; very noncompliant and cuts his dialysis Clavulanate (AUGMENTIN) 500-125 MG tab 500 mg 500 mg Oral Daily   epoetin leigh (EPOGEN,PROCRIT) injection 10,000 Units 10,000 Units Intravenous Once in dialysis   Sertraline HCl (ZOLOFT) tab 50 mg 50 mg Oral Nightly   ARIpiprazole (ABILIFY) tab 5 mg 5 mg O

## 2018-09-18 NOTE — PROGRESS NOTES
[facility]  Progress Note     Mary Hernandez Patient Status:  Inpatient    1960 MRN [id number]   [facility] 7NE-A Attending Teto Faye, [address] [facility] Day # 2 PCP [facility]     Chief Complaint: AMS    S: Patient confused yester Fluticasone Furoate-Vilanterol  1 puff Inhalation Daily   • calciTRIOL  0.5 mcg Oral Daily   • famoTIDine  20 mg Oral Daily   • folic acid  1 mg Oral Daily   • levOCARNitine  990 mg Oral Q8H   • Pantoprazole Sodium  40 mg Oral QAM AC   • azithromycin  500 arm)   Pulse 76   Temp 97.9 °F (36.6 °C) (Oral)   Resp 16   Ht 5' 8\" (1.727 m)   Wt 162 lb 3.2 oz (73.6 kg)   SpO2 94%   BMI 24.66 kg/m²   Gen: Awake, confused, NAD  Card: RRR  Pulm: Diminished    A/P  1. Acute encephalopathy  1.  Suspect due to polypharma

## 2018-09-18 NOTE — PLAN OF CARE
Assumed care at [address]. Pt A/O x1-2, confused. 4L O2 per NC. NSR on tele. VSS. Pt refusing some meds. Pt still refusing IV to be placed, Hospitalist was made aware. Plan for HD tomorrow. Call light within reach. Will continue to monitor.      Pt refusing La _________________________________________________________________________________________  ========>>  M E D I C A L   A T T E N D I N G    F O L L O W  U P  N O T E  <<=========  -----------------------------------------------------------------------------------------------------    - Patient seen and examined by me approximately thirty minutes ago.  - In summary, patient is a 77y year old man who originally presented with fall, cough, SOB  - Patient today more confused,  otherwise doing fairly, comfortable, eating ok. back pain seems better.    ==================>> REVIEW OF SYSTEM <<=================    GEN: no fever, no chills, + occasional pain in back   RESP: some occasional SOB and cough   CVS: no chest pain, no palpitations, + chronic edema  GI: no abdominal pain, no nausea, no constipation, no diarrhea  : no dysuria, no frequency, no hematuria  Neuro: no headache, no dizziness  Derm : no itching, no rash    ==================>> PHYSICAL EXAM <<=================    GEN: A&O X 2 , NAD , comfortable, a bit more forgetful and disoriented today.  HEENT: NCAT, PERRL, MMM, hearing intact  Neck: supple , no JVD  CVS: S1S2 , regular , No M/R/G appreciated  PULM: CTA B/L,  no W/R/R appreciated  ABD.: soft. non tender, non distended,  bowel sounds present, obese  Extrem: intact pulses , + b/l mild nonpitting edema  stable  PSYCH : normal mood,  a bit anxious       ==================>> MEDICATIONS <<====================    ALBUTerol/ipratropium for Nebulization 3 milliLiter(s) Nebulizer every 6 hours  aspirin enteric coated 81 milliGRAM(s) Oral daily  cholecalciferol 1000 Unit(s) Oral daily  cyanocobalamin 100 MICROGram(s) Oral daily  dabigatran 150 milliGRAM(s) Oral every 12 hours  docusate sodium 100 milliGRAM(s) Oral two times a day  donepezil 10 milliGRAM(s) Oral at bedtime  famotidine    Tablet 20 milliGRAM(s) Oral two times a day  lidocaine   Patch 1 Patch Transdermal daily  pantoprazole    Tablet 40 milliGRAM(s) Oral before breakfast  tamsulosin 0.8 milliGRAM(s) Oral at bedtime  tiotropium 18 MICROgram(s) Capsule 1 Capsule(s) Inhalation daily    MEDICATIONS  (PRN):  acetaminophen   Tablet 650 milliGRAM(s) Oral every 6 hours PRN pain    ==================>> VITAL SIGNS <<==================    Vital Signs Last 24 Hrs  T(C): 36.8 (01-07-18 @ 05:33)  T(F): 98.2 (01-07-18 @ 05:33), Max: 98.2 (01-06-18 @ 21:05)  HR: 82 (01-07-18 @ 05:33) (64 - 88)  BP: 142/79 (01-07-18 @ 05:33)  BP(mean): --  RR: 16 (01-07-18 @ 05:33) (16 - 17)  SpO2: 95% (01-07-18 @ 05:33) (95% - 100%)       ==================>> LAB AND IMAGING <<==================       no labs today   ___________________________________________________________________________________  ===============>>  A S S E S S M E N T   A N D   P L A N <<===============  ------------------------------------------------------------------------------------------    · Assessment		  78 y/o Male, ambulatory with a cane and a walker, with h/o A. fib on Pradaxa, COPD, GERD, HLD, abdominal hernia, pulmonary fibrosis, RBBB, BRIEN on CPAP at night, chronic b/l LE lymphedema, mild dementia, urinary incontinence, and recurrent UTIs came in after a fall.       Problem/Plan - 1:  ·  Problem:  likely mechanical fall with back pain likely bone contusion   - XR lumbar spine- negative for acute changes or fracture  - orthostatics not done!!  - OOB, fall precautions   - Tylenol PRN pain.   - rehab    Problem/Plan - 2:  ·  Problem: COPD exacerbation.  : Likely 2/2 to viral bronchitis, overall improved  RVP- positive for corona virus  - Duoneb  -  observe off abx,   -pulm follow up  appreciated  - nocturnal Bipap     Problem/Plan - 3:  ·  Problem: Atrial fibrillation.  Plan: Rate controlled  C/w Pradaxa 150mg BID.     Problem/Plan - 4:  Problem: GERD    c/w omeprazole and ranitidine.    Problem/Plan - 5:  ·  Problem: BRIEN   c/w nocturnal BiPAP.   pulm f/u    -GI/DVT Prophylaxis.  - monitor Delirium: supportive care  - DC planing now to rehab  --------------------------------------------  Case discussed with pt wife, RN, RT  Education given on plan of care, supportive care  ___________________________  H. JAELYN Hunter.  Pager: [phone]

## 2018-09-19 NOTE — PROGRESS NOTES
[facility]  Progress Note     Weston Dixon Patient Status:  Inpatient    1960 MRN [id number]   [facility] 7NE-A Attending Meredith Miller MD   Hosp Day # 3 PCP [facility]     Chief Complaint: AMS    S: Patient on vent/precede meropenem  500 mg Intravenous Q8H   • docusate sodium  100 mg Oral BID   • Chlorhexidine Gluconate  15 mL Mouth/Throat [email or web address]   • norepinephrine       • albuterol sulfate  2.5 mg Nebulization Q6H WA   • Potassium Chloride ER  20 mEq Oral Once   • Se

## 2018-09-19 NOTE — PROCEDURES
[facility]  Procedure Note    Radha Mena Patient Status:  Inpatient    1960 MRN [id number]   Location [facility] Attending Patrick Marvin MD   Hosp Day # 3 PCP [facility]     Procedure: Central line placement

## 2018-09-19 NOTE — PLAN OF CARE
Received patient from the floor d/t CO2 retention and respiratory distress/failure. Lethargic on arrival but following commands. See flowsheet for further assessment. Intubated after arrival to ICU.   Sedation issues immediately after intubation -- see

## 2018-09-19 NOTE — PHYSICAL THERAPY NOTE
Physical Therapy    Orders received for PT eval, but pt w/ transfer to ICU on 9/19 due to hypercapnia. Pt now intubated. Pt will require new orders for PT when medically stable to participate in therapy.

## 2018-09-19 NOTE — PLAN OF CARE
Pt transported to ICU Room 473 at 0100. Report given to Shanon PRATHER. Left voicemail to call me back for SHANON BELL [facility].

## 2018-09-19 NOTE — RESPIRATORY THERAPY NOTE
2040- Pt's inspiratory pressures on Avaps increased to 15-40 to help achieve a target VT goal of 500. Pressures currently ranging 33-38. Follow up ABG done (see chart) and [facility] Avaps settings adjusted as ordered.   Current settin

## 2018-09-19 NOTE — PROGRESS NOTES
[facility]  Nephrology Progress Note    Gary Hercules Attending:  Francisco Hancock,        Assessment and Plan:    1) ESRD- due to long-standing hypertension currently on dialysis at [facility] re[facility] qTTS; very noncompliant and cuts his dialysis HGB 7.9 09/19/2018    HCT 26.2 09/19/2018    .0 09/19/2018    CREATSERUM 4.59 09/19/2018    BUN 21 09/19/2018     09/19/2018    K 3.9 09/19/2018     09/19/2018    CO2 25.0 09/19/2018    GLU 59 09/19/2018    CA 8.5 09/19/2018    ALB 2. 2.5 mg 2.5 mg Nebulization Q6H WA   Potassium Chloride ER (K-DUR M20) CR tab 20 mEq 20 mEq Oral Once   Sertraline HCl (ZOLOFT) tab 50 mg 50 mg Oral Nightly   ARIpiprazole (ABILIFY) tab 5 mg 5 mg Oral Daily   atorvastatin (LIPITOR) tab 80 mg 80 mg Oral Nigh

## 2018-09-19 NOTE — PROGRESS NOTES
[address] [facility] Patient Status:  Inpatient    1960 MRN [id number]   [facility] 4SW-A Attending Garrett More MD   Hosp Day # 3  [facility] / Critical Care Progress Note     S: remains intubated.        Jarad Jensen shifts: In: 290 [P.O.:40; IV PIGGYBACK:250]  Out: 0   I/O this shift:  In: 100 [IV PIGGYBACK:100]  Out: -      Physical Exam:   General: intubated, sedated   Head: Normocephalic, without obvious abnormality, atraumatic.    Lungs: ctab   Chest wall: No tend tolerated. - empiric meropenem  5. ESRD  -cont HD per renal  6. AMS  -neuro following  -suspect that with clearance of CO2 mental status will improve  7. PE  -cont warfarin  8. FEN  -monitor lytes, replace per renal  -npo  9. Proph  -warfarin  10.  Dispo

## 2018-09-19 NOTE — PROGRESS NOTES
[facility] Pharmacy Note:  Renal Adjustment for meropenem (MERREM)    Christina Velasquez is a 60year old male who has been prescribed meropenem (MERREM) 500 mg every 8 hrs. CrCl is estimated creatinine clearance is 16.8 mL/min (A) (based on SCr of 4.59 mg/dL (H)).

## 2018-09-19 NOTE — CONSULTS
Critical Care H&P/Consult       NAME: Radha Mena - ROOM: 1737/7149-N - MRN: [id number] - Age: 60year old - :  1960    Date of Admission: 2018  1:53 AM  Admission Diagnosis: Somnolence [R40.0]  Hypoglycemia [E16.2]  Encephalopathy acute [G93 MCG Oral Cap 0.25 mcg = 1 cap, Oral, Daily, Cap, Maintenance Disp:  Rfl:     famoTIDine 20 MG Oral Tab Take 20 mg by mouth. Disp:  Rfl:     folic acid 1 MG Oral Tab Take 1 mg by mouth daily.    Disp:  Rfl:     mirtazapine 30 MG Oral Tab 30 mg = 1 tab, Oral, Medications:    Outpatient Medications Marked as Taking for the 9/16/18 encounter [facility] Encounter):  ipratropium-albuterol 0.5-2.5 (3) MG/3ML Inhalation Solution Take 3 mL by nebulization every 6 (six) hours as needed.  Disp:  Rfl:    risperiDONE 1 MG O ARIpiprazole  5 mg Oral Daily   • atorvastatin  80 mg Oral Nightly   • Fluticasone Furoate-Vilanterol  1 puff Inhalation Daily   • calciTRIOL  0.5 mcg Oral Daily   • famoTIDine  20 mg Oral Daily   • folic acid  1 mg Oral Daily   • levOCARNitine  990 mg Ora tenderness or deformity   Heart:    Regular rate and rhythm, S1 and S2 normal, no murmur, rub   or gallop   Abdomen:     Soft, non-tender, bowel sounds active all four quadrants,     no masses, no organomegaly   Extremities:   1+ edema- RLE   Pulses:   2+ supplementation to stop biotin consumption at least 72 hours prior to collection of a new sample.      Albumin   Date/Time Value Ref Range Status   09/16/2018 02:05 AM 2.6 (L) 3.5 - 4.8 g/dL Final       Imaging: chest x-ray reviewed- persistent RML infiltra

## 2018-09-19 NOTE — OCCUPATIONAL THERAPY NOTE
Pt was transferred from CTU to ICU last night, respiratory failure. Please place OT evaluation order when patient is medically stable.

## 2018-09-19 NOTE — SLP NOTE
Note patient has been transferred to ICU and is now orally intubated. Will hold and continue to follow.       Radha Hui Todd 87 CCC-SLP  Pager [phone]

## 2018-09-19 NOTE — PROGRESS NOTES
Impending respiratory failure   Patient with chronic renal failure on hemodialysis  Admitted with encephalopathy  Atrial fibrillation and hypotensive  RR 30s and SpO2 90% on BiPAP    Glidescope 1st attempt success  8mg IV Etomidate and 100mg IV succinylcho

## 2018-09-19 NOTE — RESPIRATORY THERAPY NOTE
Placed pt on BIPAP, 16/5 40% pt not getting tidal volumes of at least 500, placed pt on AVAPS 500/+5/40%/16/ inspiratory pressure 15-30 will repeat ABG 45 mins

## 2018-09-19 NOTE — PLAN OF CARE
Glucose maintained within prescribed range Not Progressing      Achieves stable or improved neurological status Not Progressing      Achieves optimal ventilation and oxygenation Not Progressing      Pt remains intubated and sedated on Precedex gtt.  Ines Jones

## 2018-09-20 NOTE — PROGRESS NOTES
[facility]  Progress Note     Amna Amato Patient Status:  Inpatient    1960 MRN [id number]   [facility] 7NE-A Attending Khurram Ibanez MD   Hosp Day # 4 PCP [facility]     Chief Complaint: intubated    S: Patient awake, re (H)).    Recent Labs   Lab  09/18/18   1139  09/19/18   0436  09/20/18   0422   PTP  25.8*  28.1*  32.2*   INR  2.27*  2.53*  3.01*       Recent Labs   Lab  09/16/18   0205   TROP  <0.046            Imaging: Imaging data reviewed in Epic.     Medications: Pt. Seen and examined. Gen: NAD  CVS: s1s2  Resp: CTA  Abd: soft    -holding AC  -cont.  PPI  -monitor Hgb  -no urgent need for INR reversal, monitor for now    Mariella Diop MD

## 2018-09-20 NOTE — RESPIRATORY THERAPY NOTE
Attempted SBT after dialysis, pt follows minimal commands. Was able to give me a NIF of -17 but unable to perform a VC maneuver. On 5/+5/40% pt Vts were very irregular  ml.  RSBI above 100 for the 10 minutes he was on trial.placed back on full support

## 2018-09-20 NOTE — PLAN OF CARE
Altered Communication/Language Barrier    • Patient/Family is able to understand and participate in their care Progressing        CARDIOVASCULAR - ADULT    • Maintains optimal cardiac output and hemodynamic stability Progressing        Delirium    • Minimi

## 2018-09-20 NOTE — PLAN OF CARE
Assumed care at [address] [place] [address]. Sedated. See flowsheet for further assessment. Vented. Precedex drip for sedation. SR w/ 1st degree AVB. Weaning levophed as tolerated for goal of SBP >90. NPO. OG to CARLOS Alfonso.   Episode of hypoglycemia -- protocol fol

## 2018-09-20 NOTE — PROGRESS NOTES
[facility]  Nephrology Progress Note    Mary Hernandez Attending:  Teto Faye DO       Assessment and Plan:    1) ESRD- due to long-standing hypertension currently [facility] q[facility]S; very noncompliant and cuts his dialysis extremities  Skin: Warm and dry, no rashes       Labs:   Lab Results   Component Value Date    WBC 2.9 09/20/2018    HGB 8.3 09/20/2018    HCT 27.3 09/20/2018    .0 09/20/2018    CREATSERUM 5.44 09/20/2018    BUN 26 09/20/2018     09/20/2018 100 mL  mg Intravenous Q24H   Heparin Lock Flush 100 UNIT/ML lock flush 150 Units 1.5 mL Intravenous Once   Pantoprazole Sodium (PROTONIX) 40 mg in Sodium Chloride 0.9 % 10 mL IV push 40 mg Intravenous Q12H   albuterol sulfate (VENTOLIN) (2.5 MG/3ML

## 2018-09-20 NOTE — SLP NOTE
Patient remains orally intubated and not appropriate for follow up. Will hold and continue to follow peripherally, resuming services when clinically appropriate.     Fernanda Hui Todd 87 CCC-SLP  Pager [phone]

## 2018-09-20 NOTE — PROGRESS NOTES
Мария Manzano Patient Status:  Inpatient    1960 MRN [id number]   [facility] 4SW-A Attending Isaiah Schuler MD   Hosp Day # 4 PCP [facility]     Critical Care Progress Note      Assessment/Plan:  1.  Acute Hypercapnic Resp Failure: mL  PEEP/CPAP (cm H2O):  [4 cm H20-5 cm H20] 5 cm H20  MAP (cm H2O):  [10-15] 10    Intake/Output Summary (Last 24 hours) at 9/20/2018 0949  Last data filed at 9/20/2018 0630  Gross per 24 hour   Intake 1295 ml   Output 400 ml   Net 895 ml       /43 interpretation except where noted.

## 2018-09-20 NOTE — CONSULTS
[facility]                       Gastroenterology [address] [facility] Gastroenterology    Faye Gan Patient Status:  Inpatient    1960 MRN [id number]   [facility] 4SW-A Attending Garrett More MD   Hosp Day # 4 PCP [facility] Rectal Q6H PRN   fentaNYL citrate (SUBLIMAZE) 1,000 mcg in sodium chloride 0.9 % 100 mL infusion  mcg/hr Intravenous Continuous PRN   docusate sodium (COLACE) liquid 100 mg 100 mg Oral BID   PEG 3350 (MIRALAX) powder packet 17 g 17 g Oral Daily PRN chloride 0.9 % 500 mL IVPB 25 mg/kg Intravenous Once   Fluticasone Furoate-Vilanterol (BREO ELLIPTA) 200-25 MCG/INH inhaler 1 puff 1 puff Inhalation Daily   [] azithromycin (ZITHROMAX) 500 mg in sodium chloride 0.9 % 250 mL IVPB 500 mg Intravenous Q Intubated and sedated   HENT: EOMI, PERRL, oropharynx is clear with moist mucosal membranes; OG intact draining bilious output with scant flecks of bright red blood (canister with 75 ml of dark red blood)  Eyes: Sclerae are anicteric  Neck:  Supple without 14:05.     INDICATIONS:  resp failure     PATIENT STATED HISTORY: (As transcribed by Technologist)         Impression:     CONCLUSION:    Support lines and catheters appear stable with endotracheal tube 7.5 cm above carinal, central line tip in the SVC.  R collection.     Sequelae of mild chronic small vessel ischemic disease is suggested.  These FLAIR abnormalities can also be seen with migraine headaches and demyelinating process these.     Marked opacification of bilateral mastoid air cells is noted.     D coagulopathy. Will consider EGD if he has further bleeding. He is high risk for endoscopy given Levophed requirements. Obtain Abdominal XR.      Mat Vinson DO  2:06 PM  9/20/2018  [facility] Gastroenterology  [phone]    '

## 2018-09-21 NOTE — PROGRESS NOTES
Gastroenterology Progress Note  Patient Name: Dmitri Cavazos  Chief Complaint: bloody output from OGT  S: Per nursing, pt with only 5 cc of blood tinged output overnight.  Pt has not had a BM>   O: /59   Pulse 80   Temp 98 °F (36.7 °C) (Temporal)   Re ml of dark red/coffee-ground drainage and has since been draining mostly bile/gastric secretions. Bleeding maybe d/t trauma in the setting of a therapeutic INR. Hgb with slight decline.     Recommendations:      1. Continue PPI BID   2.  Monitor OG suction,

## 2018-09-21 NOTE — PROGRESS NOTES
Critical Care Progress Note        NAME: Konrad Smiley - ROOM: 769/204-T - MRN: [id number] - Age: 60year old - : 1960  Date of Admission: 2018  1:53 AM  Admission Diagnosis: Somnolence [R40.0]  Hypoglycemia [E16.2]  Encephalopathy acute [G93.4 Continuous Infusing Medication:  • Dextrose-NaCl 40 mL/hr at 09/21/18 0544   • fentanyl (SUBLIMAZE) infusion Stopped (09/21/18 0815)   • dexmedetomidine 0.3 mcg/kg/hr (09/21/18 0830)   • norepinephrine 1 mcg/min (09/21/18 0704)     PRN Medication:fenta exhibit interference when a sample is collected from a person who is consuming high dose of biotin supplements resulting in biotin serum concentrations >100 ng/mL.  It is recommended that physicians ask all patients who may be on biotin supplementation to s recs at this time  8. PE  -cont warfarin  9. FEN  -monitor lytes, replace per renal  -start TFs  10. Proph  -warfarin  11.  Dispo  -ICU  -full code  -critically ill        Critical Care Time greater than: [address] [facility]

## 2018-09-21 NOTE — PROGRESS NOTES
[facility]  Nephrology Progress Note    Becki Bah Attending:  Maurice Junior,        Assessment and Plan:    1) ESRD- due to long-standing hypertension currently [facility] dialysis [facility] qTTS; very noncompliant and cuts his dialysis edema  Neurologic: Cranial nerves grossly intact, moving all extremities  Skin: Warm and dry, no rashes       Labs:   Lab Results   Component Value Date    WBC 2.5 09/21/2018    HGB 7.8 09/21/2018    HCT 25.4 09/21/2018    .0 09/21/2018    CREATSERU (LEVOPHED) 4 mg/250 ml premix infusion 0.5-30 mcg/min Intravenous Continuous   Meropenem (MERREM) 500 mg in sodium chloride 0.9% 100 mL  mg Intravenous Q24H   Heparin Lock Flush 100 UNIT/ML lock flush 150 Units 1.5 mL Intravenous Once   Pantoprazole

## 2018-09-21 NOTE — DIETARY NOTE
NUTRITION INITIAL ASSESSMENT    Pt is at moderate nutrition risk. Pt does not meet malnutrition criteria. NUTRITION DIAGNOSIS/PROBLEM:    Inadequate oral intake related to inability to consume sufficient energy as evidenced by intubated/sedated.     NUTR calories/day (25-30 calories per kg)  Protein:  grams protein/day (1.2-1.5 grams protein per kg)  Fluid: ~1 ml/kcal or per MD discretion    MONITOR AND EVALUATE/NUTRITION GOALS:    1. PO intake to meet at least 75% patient nutrition prescription  3.

## 2018-09-21 NOTE — PROGRESS NOTES
[facility]  Progress Note     Lulu Los Patient Status:  Inpatient    1960 MRN [id number]   [facility] 7NE-A Attending Eduardo Martin MD   Hosp Day # 5 PCP [facility]     Chief Complaint: intubated    S: Patient awake, re 32.2*  37.7*   INR  2.53*  3.01*  3.69*       Recent Labs   Lab  09/16/18   0205   TROP  <0.046            Imaging: Imaging data reviewed in Epic.     Medications:   • Midodrine HCl  10 mg Oral TID   • [START ON 9/22/2018] epoetin leigh  10,000 Units Gabriela Abreu studies  13. Acidosis, resolved  14. Hypoglycemia, recurrent  1.  Accuchecks, Dextrose IVF ongoing, consider TF when ok with GI    Quality:  · DVT Prophylaxis: supratherapeutic -  INR 3.69  · CODE status: DNR - nephrology spoke with sister Tray Ochoa    Dominic

## 2018-09-21 NOTE — PLAN OF CARE
Assumed care at [address] [place] [address]. Sedated. Awakes very easily. Episodes of agitation. See flowsheet for further assessment. Vented. Precedex and fentanyl drip for sedation. SR w/ 1st degree AVB. Weaning levophed as tolerated for goal of SBP >90. NPO.

## 2018-09-21 NOTE — CONSULTS
[address] [place] [address] with [facility]  9/21/2018    5:06 PM      Reevaluation requested because of his respiratory weakness  Previously seen for mental status changes but developed CO2 retention leading to intuba intact  Squeezes with both hands but somewhat submaximal (uncertain if due to weakness)  DTRs uppers however showed intact 1-2+ brachioradialis bilaterally. Cannot examine the legs very well because of pain.       Toxic Metabolic Encephalopathy which has i

## 2018-09-21 NOTE — PROGRESS NOTES
[facility] Pharmacy Note:  Renal Adjustment for piperacillin/tazobactam (Magdalene Schneider)    Christina Velasquez is a 60year old male who has been prescribed piperacillin/tazobactam (ZOSYN) 3375 mg every 8 hrs.   Patient is on HD so the dose has been adjusted to piperacillin/ta

## 2018-09-21 NOTE — PROGRESS NOTES
[facility]  Report of Psychiatric Progress Note    Date of Admission: 9/16/18  Date of Service: 9/21/18  Reason for Consultation: Altered mental status     Impression:  Primary Psychiatric Diagnosis:  Delirium/encephalopathy, initially thought to be d no acute infarct. Clive Dang    History of Present Illness:  61 yo with hx of depressive disorder was admitted on 9/16/18 for evaluation of his lethargy and decreased responsiveness.     He is a ESRD patient on HD for 13-14 yrs.  He has been  fo panting. He is able to point to the tube and communicate that he wants it out.  He is able to follow commands.      Psychiatry Review Systems: No elevated mood, racing thoughts, decreased need for sleep, grandiose thoughts     Past Psychiatric History:  1) Exam:      Risk Assessment  Suicidal ideation: no suicidal ideation  Homicidal ideation: None noted     Objective        09/21/18  0900   BP: 101/59   Pulse: 80   Resp: 19   Temp:      Appearance: fair grooming  Behavior: able to follow commands, open his       Minimal FLAIR signal abnormalities in the subcortical and deep periventricular white matter along with the right dentate nucleus is noted.     No significant abnormal parenchymal gradient susceptibility.       There is no restricted diffusion to sugg

## 2018-09-22 NOTE — PROGRESS NOTES
Gastroenterology Progress Note  Patient Name: Jackelin Son  Chief Complaint: bloody output from OGT, ascites  S: Per nursing, pt without further blood tinged output and has not had melena. He was started on TF last night which he is tolerating.  Pt denies red/coffee-ground drainage and has since been draining mostly bile/gastric secretions. Bleeding maybe d/t trauma in the setting of elevated INR. Hgb with slight decline. Pt without signs of brisk GI bleeding.  CT showed ascites and mucus plugging.      Romeo

## 2018-09-22 NOTE — PROGRESS NOTES
[facility] Patient Status:  Inpatient    1960 MRN [id number]   [facility] 4SW-A Attending Shan Alicia MD   [facility] Day # 6 PCP [facility]     Critical Care Progress Note     Date of Admission: 2018  1:53 A MCG/100ML premix infusion, 0.2-1.5 mcg/kg/hr, Intravenous, Continuous  •  norepinephrine (LEVOPHED) 4 mg/250 ml premix infusion, 0.5-30 mcg/min, Intravenous, Continuous  •  Heparin Lock Flush 100 UNIT/ML lock flush 150 Units, 1.5 mL, Intravenous, Once  • output:150;  Other:56]  I/O this shift:  In: 1101.5 [I.V.:812.5; NG/GT:289]  Out: -      General appearance: alert, appears stated age, cooperative and intubated, mildly sedated  Lungs: diminished on R, coarse on left  Heart: regular rate and rhythm  Abdo CPT with R side up, L side down. If no improvement in retained secretions, consider bronchoscopy for airway clearance  -spot EtCO2 monitoring if extubation  2. Pulm Edema/anasarca/ascites  -cont HD per renal  3.  PNA- possibly aspiration given the findings

## 2018-09-22 NOTE — PROGRESS NOTES
[facility]  Nephrology Progress Note    Mary Hernandez Attending:  Teto Faye DO       Assessment and Plan:    1) ESRD- due to long-standing hypertension [facility]; very noncompliant and cuts his dialysis 09/22/2018    CREATSERUM 5.07 09/22/2018    BUN 23 09/22/2018     09/22/2018    K 3.7 09/22/2018     09/22/2018    CO2 25.0 09/22/2018     09/22/2018    CA 8.0 09/22/2018    INR 4.77 09/22/2018    PTP 46.1 09/22/2018    PGLU 101 09/22/20 Pantoprazole Sodium (PROTONIX) 40 mg in Sodium Chloride 0.9 % 10 mL IV push 40 mg Intravenous Q12H   albuterol sulfate (VENTOLIN) (2.5 MG/3ML) 0.083% nebulizer solution 2.5 mg 2.5 mg Nebulization Q6H WA   Potassium Chloride ER (K-DUR M20) CR tab 20 mEq 2

## 2018-09-22 NOTE — PLAN OF CARE
METABOLIC/FLUID AND ELECTROLYTES - ADULT    • Glucose maintained within prescribed range Not Progressing        RESPIRATORY - ADULT    • Achieves optimal ventilation and oxygenation Not Progressing          CARDIOVASCULAR - ADULT    • Maintains optimal car

## 2018-09-22 NOTE — PROGRESS NOTES
[facility]  Progress Note     Briakhurram Headley Patient Status:  Inpatient    1960 MRN [id number]   [facility] 4SW-A Attending Luke Boone MD   Hosp Day # 6 PCP [facility]     Chief Complaint: resp failure    S: Patient Aimee Pi Recent Labs   Lab  09/16/18   0205   TROP  <0.046            Imaging: Imaging data reviewed in Epic.     Medications:   • piperacillin-tazobactam  4.5 g Intravenous Q12H   • albuterol sulfate  2.5 mg Nebulization 6 times per day   • Midodrine HCl  10

## 2018-09-22 NOTE — PLAN OF CARE
Achieves optimal ventilation and oxygenation Not Progressing      Glucose maintained within prescribed range Progressing      Achieves stable or improved neurological status Progressing      Verbalizes/displays adequate comfort level or patient's stated pa

## 2018-09-22 NOTE — PROGRESS NOTES
[facility] Pharmacy Note:  Dose Adjustment for piperacillin/tazobactam (Luis Leger)    Josefina Palmer is a 60year old male who has been prescribed piperacillin/tazobactam (ZOSYN) 3.375 g every 12 hrs.   CrCl is estimated creatinine clearance is 15.2 mL/min (A) (based

## 2018-09-22 NOTE — PLAN OF CARE
CARDIOVASCULAR - ADULT    • Maintains optimal cardiac output and hemodynamic stability Not Progressing          METABOLIC/FLUID AND ELECTROLYTES - ADULT    • Glucose maintained within prescribed range Progressing        NEUROLOGICAL - ADULT    • Achieves s

## 2018-09-23 NOTE — PLAN OF CARE
CARDIOVASCULAR - ADULT    • Maintains optimal cardiac output and hemodynamic stability Progressing        RISK FOR INFECTION - ADULT    • Absence of fever/infection during anticipated neutropenic period Progressing          Assumed care of patient at 0730.

## 2018-09-23 NOTE — PROGRESS NOTES
Critical Care Progress Note     Assessment / Plan:  1.  Acute hypercapnic and hypoxic resp failure  - continue mechanical vent, failed weaning parameters due to weakness, abdominal distension from ascites and profound consolidation and mucous plugging on R rashes  Mental status - interactive, follows commands    Medications:  Reviewed in EMR    Lab Data:  Reviewed in EMR    Imaging:  Chest imaging reviewed

## 2018-09-23 NOTE — RESPIRATORY THERAPY NOTE
Received pt on VC+ 16/500/40%/+5. Increased fi02 to 50% due to desaturation. Current vent settings are VC+ 16/500/45%/+5.  Continue IPV Q4.

## 2018-09-23 NOTE — PROGRESS NOTES
Gastroenterology Progress Note  Patient Name: Edwin Woods  Chief Complaint: bloody output from OGT, ascites  S: Per nursing, pt without further blood tinged output and has not had melena.  He is tolerating TF.   O: /90 (BP Location: Left arm)   Pul drainage and has since been draining mostly bile/gastric secretions. Bleeding maybe d/t trauma in the setting of elevated INR. Hgb with slight decline. Pt without signs of brisk GI bleeding.  CT showed ascites and mucus plugging.      Recommendations:

## 2018-09-23 NOTE — PLAN OF CARE
Received patient on ventilator at 50% FIO2, down to 45% by this morning. Patient tolerating ventilator through night without any desaturation episodes. Large amounts of oral secretions requiring frequent suctioning.  Patient awake and alert and restless at

## 2018-09-23 NOTE — PROGRESS NOTES
[facility]  Progress Note     Dmitri Cavazos Patient Status:  Inpatient    1960 MRN [id number]   [facility] 4SW-A Attending Silvina Bryan MD   Hosp Day # 7 PCP [facility]     Chief Complaint: resp failure    S: Patient is mo 09/23/18   0431   PTP  37.7*  46.1*  22.2*   INR  3.69*  4.77*  1.87*       No results for input(s): TROP, CK in the last 168 hours. Imaging: Imaging data reviewed in Epic.     Medications:   • Initial dose Heparin infusion  12 Units/kg/hr Intraveno

## 2018-09-23 NOTE — PROGRESS NOTES
[facility]  Nephrology Progress Note    Weston Dixon Attending:  Ute López,        Assessment and Plan:    1) ESRD- due to long-standing hypertension currently on dialysis at [facility] qTTS; very noncompliant and cuts his dialysis 09/23/2018    CREATSERUM 4.02 09/23/2018    BUN 21 09/23/2018     09/23/2018    K 3.5 09/23/2018     09/23/2018    CO2 29.0 09/23/2018    GLU 78 09/23/2018    CA 8.3 09/23/2018    INR 1.87 09/23/2018    PTP 22.2 09/23/2018    MG 1.9 09/23/2018 Pantoprazole Sodium (PROTONIX) 40 mg in Sodium Chloride 0.9 % 10 mL IV push 40 mg Intravenous Q12H   Potassium Chloride ER (K-DUR M20) CR tab 20 mEq 20 mEq Oral Once   Sertraline HCl (ZOLOFT) tab 50 mg 50 mg Oral Nightly   ARIpiprazole (ABILIFY) tab 5 mg

## 2018-09-24 PROBLEM — Z71.89 GOALS OF CARE, COUNSELING/DISCUSSION: Status: ACTIVE | Noted: 2018-01-01

## 2018-09-24 PROBLEM — Z51.5 PALLIATIVE CARE ENCOUNTER: Status: ACTIVE | Noted: 2018-01-01

## 2018-09-24 NOTE — PLAN OF CARE
RESPIRATORY - ADULT    • Achieves optimal ventilation and oxygenation Not Progressing          Delirium    • Minimize duration of delirium Progressing        METABOLIC/FLUID AND ELECTROLYTES - ADULT    • Glucose maintained within prescribed range Progressi

## 2018-09-24 NOTE — PROGRESS NOTES
Critical Care Progress Note        NAME: Kirsten Pino - ROOM: [id number]-J - MRN: [id number] - Age: 60year old - : 1960  Date of Admission: 2018  1:53 AM  Admission Diagnosis: Somnolence [R40.0]  Hypoglycemia [E16.2]  Encephalopathy acute [G93.4 (09/24/18 1000)   • dexmedetomidine 0.5 mcg/kg/hr (09/24/18 1000)   • norepinephrine Stopped (09/23/18 1100)     PRN Medication:fentaNYL citrate **OR** fentaNYL citrate, acetaminophen **OR** acetaminophen **OR** acetaminophen, fentanyl (SUBLIMAZE) infusion supplements resulting in biotin serum concentrations >100 ng/mL. It is recommended that physicians ask all patients who may be on biotin supplementation to stop biotin consumption at least 72 hours prior to collection of a new sample.    07/28/2018 02:00 PM ill      Critical Care Time greater than: 168 [facility] Pulmonary and Critical Care

## 2018-09-24 NOTE — PROGRESS NOTES
[facility]  Report of GI Consultation    Мария Manzano Patient Status:  Inpatient    1960 MRN [id number]   [facility] 4SW-A Attending Isaiah Schuler MD   Hosp Day # 8 PCP [facility]     Date of Admission:  2018  PCP: Quentin Dockery Chlorhexidine Gluconate (PERIDEX) 0.12 % solution 15 mL 15 mL Mouth/Throat [email or web address]   Dexmedetomidine HCl in NaCl (PRECEDEX) 400 MCG/100ML premix infusion 0.2-1.5 mcg/kg/hr Intravenous Continuous   norepinephrine (LEVOPHED) 4 mg/250 ml premix infusi 09/24/2018     09/24/2018    K 3.7 09/24/2018    CO2 29.0 09/24/2018    CA 8.4 09/24/2018    ALB 2.1 (L) 09/21/2018    ALKPHO 101 09/21/2018    TP 5.5 (L) 09/21/2018    AST 7 (L) 09/21/2018    ALT 7 (L) 09/21/2018    BILT 0.5 09/21/2018    PTT 78.1 ( liver suggests hepatic congestion. Alb 2.1, Echo shows moderate to severe mitral regurg with pulm HTN and RV dilation and reduced function. Suspect a combination of hypoalbuminemia, ESRD, and mitral regurg affecting R sided pressures.     4. DVT/PE, on an

## 2018-09-24 NOTE — OPERATIVE REPORT
Bronchoscopy procedure report    Preop diagnosis: abnl CT chest  Postop diagnosis:  Fungating airway mass  Procedure performed: Bronchoscopy, Diagnostic  Endobronchial biopsy    Sedation used: 2 mg of versed, 50 mcg of fentanyl, topical lidocaine  Moderate

## 2018-09-24 NOTE — CM/SW NOTE
Clinical updates [facility] [employer] [facility] via [facility]. / to remain available for support and/or discharge planning.      Frida Resendiz RN, [facility]  [employer] [phone]

## 2018-09-24 NOTE — PROCEDURES
[facility]  Procedure Note    Becki Bah Patient Status:  Inpatient    1960 MRN [id number]   [facility] 4SW-A Attending Melina Lai MD   Hosp Day # 8 PCP [facility]     Procedure: Paracentesis    LOCATION: Left mid abdome

## 2018-09-24 NOTE — PROGRESS NOTES
09/24/18 5020   Clinical Encounter Type   Visited With Health care provider   Continue Visiting Yes  (Unable to do Adv. Dir.  as pt is intubated.)

## 2018-09-24 NOTE — CONSULTS
[address]  [id number]  Hospital Day #8  Date of Consult: 09/24/18       Reason for Consultation: Consult requested for evaluation of palliative care needs and goals of care discussion.     H requests a return call on 9/25/18 at 0900 to continue [address] discussions.           Healthcare Agent Appointed: Yes  Healthcare Agent's Name: [id number] Agent's Phone Number: (c) [phone] (h) [phone]            Psychosocial:Emotional [place]

## 2018-09-24 NOTE — PROGRESS NOTES
[facility]  Progress Note     Sy Vanegas Patient Status:  Inpatient    1960 MRN [id number]   [facility] 4SW-A Attending Patrecia Cooks, MD   Hosp Day # 8 PCP [facility]     Chief Complaint: resp failure    S: Patient is mo 7*  <6*  7*   --    --    --    --    BILT  0.5  0.5  0.5   --    --    --    --    TP  6.1  5.9*  5.5*   --    --    --    --     < > = values in this interval not displayed.        Recent Labs   Lab  09/23/18   0431  09/23/18   1210  09/24/18   0531   PTP vent  CVS: s1s2  Resp: course  Abd: soft    -discussed with sister in detail  -plan on paracentesis and possible bronch today  -cont.  PPI      Kenya Powell MD

## 2018-09-24 NOTE — PLAN OF CARE
Patient resting comfortably in bed and remains stable through night on the ventilator and sedated with fentanyl and precedex gtt. Patient easily wakens to voice and is able to follow commands and attempts to mouth words.  Bilateral wrist restraints in place

## 2018-09-25 NOTE — PLAN OF CARE
CARDIOVASCULAR - ADULT    • Maintains optimal cardiac output and hemodynamic stability Progressing        METABOLIC/FLUID AND ELECTROLYTES - ADULT    • Glucose maintained within prescribed range Progressing        NEUROLOGICAL - ADULT    • Achieves maximal

## 2018-09-25 NOTE — PROGRESS NOTES
09/25/18 1820   Clinical Encounter Type   Visited With Patient   Routine Visit Introduction   Continue Visiting No   Patient's Supportive Strategies/Resources  provided emotional support.    Patient Spiritual Encounters   Spiritual Needs

## 2018-09-25 NOTE — PROGRESS NOTES
[facility]  Nephrology Progress Note    Allyssa Humphrey Attending:  Ree Dewey,        Assessment and Plan:    1) ESRD- due to long-standing hypertension [facility] q[facility]S; very noncompliant and cuts his dialysis cyanosis; + LE edema  Neurologic: Cranial nerves grossly intact, moving all extremities  Skin: Warm and dry, no rashes       Labs:   Lab Results   Component Value Date    WBC 3.9 09/25/2018    HGB 8.2 09/25/2018    HCT 26.3 09/25/2018    .0 09/25/20 10 mg Rectal Daily PRN   FLEET ENEMA (FLEET) 7-19 GM/118ML enema 133 mL 1 enema Rectal Once PRN   Chlorhexidine Gluconate (PERIDEX) 0.12 % solution 15 mL 15 mL Mouth/Throat [email or web address]   Dexmedetomidine HCl in NaCl (PRECEDEX) 400 MCG/100ML premix infusio

## 2018-09-25 NOTE — PROGRESS NOTES
Assumed care at [address] [place] [address] Road. Drowsy. Agitated at times. See flowsheet for further assessment. Vented. Precedex and fentanyl drips for sedation. SR/SB. SB (HR 38) episode, precedex drip decreased -- APN updated and aware.   After decreasing precedex HR up to

## 2018-09-25 NOTE — CM/SW NOTE
SW/CM Care Coordination Rounds    Allyssa Humphrey Patient Status:  Inpatient   Age/Gender 60year old male MRN [id number]   [facility] 4SW-A Attending Jimena Acevedo MD   Hosp Day # 9 PCP [facility]     Primary Diagnosis: Somnolence [R69

## 2018-09-25 NOTE — PROGRESS NOTES
[facility]  Progress Note     Kirsten Pino Patient Status:  Inpatient    1960 MRN [id number]   [facility] 4SW-A Attending Natty Mccoy MD   Hosp Day # 9 PCP [facility]     Chief Complaint: resp failure    S: Patient awake 0.5  0.5  0.5   --    --    --    --    TP  6.1  5.9*  5.5*   --    --    --    --     < > = values in this interval not displayed.        Recent Labs   Lab  09/23/18   1210  09/24/18   0531  09/25/18   0400   PTP  21.9*  19.3*  18.3*   INR  1.84*  1.57*  1 notify patient sister Areli Milton- [place] if any change in status. Reyna Essex PA  10:37 AM     Addendum:    Agree with above note. Pt. Seen and examined. Gen: intubated  CVS: s1s2  Resp: course  Abd: soft    -cont.  ABX  -path from bronch pending  -discu

## 2018-09-25 NOTE — PROGRESS NOTES
[facility]  Report of GI Consultation    Weston Dixon Patient Status:  Inpatient    1960 MRN [id number]   [facility] 4SW-A Attending Richard Arthur MD   Hosp Day # 9 PCP [facility]     Date of Admission:  2018  PCP: Chaim Swann Oral Daily PRN   magnesium hydroxide (MILK OF MAGNESIA) 400 MG/5ML suspension 30 mL 30 mL Oral Daily PRN   bisacodyl (DULCOLAX) rectal suppository 10 mg 10 mg Rectal Daily PRN   FLEET ENEMA (FLEET) 7-19 GM/118ML enema 133 mL 1 enema Rectal Once PRN   Chlor Data:  Lab Results   Component Value Date    WBC 3.9 (L) 09/25/2018    HGB 8.2 (L) 09/25/2018    .0 09/25/2018    CREATSERUM 5.44 (H) 09/25/2018    BUN 37 (H) 09/25/2018     (L) 09/25/2018    K 4.2 09/25/2018    CO2 26.0 09/25/2018    CA 8.1 ( Approved by: Roddy Jorge MD                 Impression:   1. Hypercapnic resp failure requiring emergent intubation  2. ESRD, on HD (T/Th/Sat)  3. Ascites on imaging - LFTs normal this admission, and imaging of liver suggests hepatic congestion.   Al

## 2018-09-25 NOTE — PALLIATIVE CARE NOTE
[address] Follow Up      Ajithvicente Greene  [id number]       Patient seen and evaluated, no family at bedside. Phone conference occurred with patient's sisters/Guillermina and Xiomara Guidry. Jean-Pierre Carrion is HCPOA.     Results of bronch and paracen

## 2018-09-25 NOTE — PROGRESS NOTES
[address] [facility] Patient Status:  Inpatient    1960 MRN [id number]   [facility] 4SW-A Attending Alee Gimenez MD   [address] Day # 9 PCP [facility]     Critical Care Progress Note     Date of Admission: 2018  1:53 A packet 17 g, 17 g, Oral, Daily PRN  •  magnesium hydroxide (MILK OF MAGNESIA) 400 MG/5ML suspension 30 mL, 30 mL, Oral, Daily PRN  •  bisacodyl (DULCOLAX) rectal suppository 10 mg, 10 mg, Rectal, Daily PRN  •  FLEET ENEMA (FLEET) 7-19 GM/118ML enema 133 mL Encounters:  09/25/18 : 158 lb 11.7 oz (72 kg)  09/11/18 : 158 lb 11.7 oz (72 kg)  04/08/18 : 136 lb (61.7 kg)      Intake/Output Summary (Last 24 hours) at 9/25/2018 0923  Last data filed at 9/25/2018 0600  Gross per 24 hour   Intake 561 ml   Output 2630 94.1  95.5  94.6  93.9   MCH  30.2  29.2  29.4  29.3   MCHC  32.1  30.6*  31.1  31.2   RDW  16.4*  16.4*  16.6*  16.2*   NEPRELIM  5.48   --   2.16  2.24   WBC  7.0  4.8  3.9*  3.9*   PLT  273.0  229.0  213.0  207.0       Recent Labs   Lab  09/23/18   1210 code  - critically ill    Critical Care Time greater than: 35 minutes    Chino Plata MD  9/25/2018  9:15 AM

## 2018-09-26 NOTE — RESPIRATORY THERAPY NOTE
I received this patient intubated on [facility] vent VC+ 16/500/40%/ +5. Patient is tolerating vent well. Patient able to follow commands and track you. IPV albuterol Q4 with Q4 CPT through bed is ordered. Will continue to monitor.

## 2018-09-26 NOTE — PROGRESS NOTES
Critical Care Progress Note        NAME: Rafia Wang - ROOM: 55/439-R - MRN: [id number] - Age: 60year old - : 1960  Date of Admission: 2018  1:53 AM  Admission Diagnosis: Somnolence [R40.0]  Hypoglycemia [E16.2]  Encephalopathy acute [G93.4 fentanyl (SUBLIMAZE) infusion 100 mcg/hr (09/26/18 0432)   • dexmedetomidine 0.5 mcg/kg/hr (09/26/18 0750)   • norepinephrine Stopped (09/23/18 1100)     PRN Medication:Albumin Human, morphINE sulfate, Midazolam HCl, fentaNYL citrate **OR** fentaNYL citrat serum concentrations >100 ng/mL. It is recommended that physicians ask all patients who may be on biotin supplementation to stop biotin consumption at least 72 hours prior to collection of a new sample.    07/28/2018 02:00 PM 3.740 0.350 - 5.500 mIU/mL Vannesa and hypoalbuminemia  - cont HD per renal - goal of aggressive volume removal as able  4. PNA - possibly aspiration vs post-obstructive given the findings on CT chest  - finished 14d course of ness recently for ESBL  - cont zosyn, day 8 of 10  5.  Septic boone

## 2018-09-26 NOTE — PROGRESS NOTES
[facility]  Progress Note     Ysabel Landis Patient Status:  Inpatient    1960 MRN [id number]   [facility] 4SW-A Attending Dr. Jae Hannah Day # 8 PCP [facility]     Chief Complaint: resp failure    S: Patient awake, vent values in this interval not displayed. Recent Labs   Lab  09/24/18   0531  09/25/18   0400  09/26/18   0453   PTP  19.3*  18.3*  16.7*   INR  1.57*  1.46*  1.30*       No results for input(s): TROP, CK in the last 168 hours.          Imaging: Imaging tomorrow at 11am with palliative care. F/u on final path. Jenny LANG  12:13 PM     Hospitalist Addendum    S: Patient seen and examined and agree with above.     O: /81   Pulse 94   Temp 99.4 °F (37.4 °C) (Temporal)   Resp (!) 27   Ht 5'

## 2018-09-26 NOTE — DIETARY NOTE
NUTRITION INITIAL ASSESSMENT    Pt is at moderate nutrition risk. Pt does not meet malnutrition criteria. NUTRITION DIAGNOSIS/PROBLEM:    Inadequate oral intake related to inability to consume sufficient energy as evidenced by intubated/sedated.     NUTR 72 kg (158 lb 11.7 oz)  09/11/18 : 72 kg (158 lb 11.7 oz)  04/08/18 : 61.7 kg (136 lb)      NUTRITION:  Diet: NPO  Oral Supplements: none    FOOD/NUTRITION RELATED HISTORY:  Appetite: n/a  Intake: n/a  Intake Meeting Needs: No  Food Allergies: No  Cultural

## 2018-09-26 NOTE — PALLIATIVE CARE NOTE
[address] Follow Up      Maryann Ramirez  [id number]       Patient seen and evaluated, no family at bedside. Awaiting path results.  Discussed likely findings with pulmonologist.    ROS: unable to provide     Physical Exam:  JUSTYNA

## 2018-09-26 NOTE — CM/SW NOTE
Responding to hospice order. Placed [facility] referral as per [facility] APN order. PC has already contacted [facility] RN via phone. Plan: [facility] to meet with POA at 1100 tomorrow for anticipated move towards hospice.   Meaghan Dutton, 09/26/18, 3:

## 2018-09-26 NOTE — PLAN OF CARE
Assumed care at [address]. Alert. Agitation at times. See flowsheet for further assessment. Vented. Precedex and fentanyl drips for sedation. SR/SB. VSS. Heparin drip -- per ACS/Afib protocol. Dialysis overnight --- 3.3L out.   Tolerated well, albumin gi

## 2018-09-26 NOTE — PROGRESS NOTES
[facility]  Nephrology Progress Note    Laura Morris Attending:  Cassandra Ravi DO       Assessment and Plan:    1) ESRD- due to long-standing hypertension; remains volume up-> repeat HD / UF today    2) Hypercapneic resp failure- due to RLL PNA + f Lab Results   Component Value Date    WBC 2.9 09/26/2018    HGB 8.3 09/26/2018    HCT 26.7 09/26/2018    .0 09/26/2018    CREATSERUM 3.16 09/26/2018    BUN 18 09/26/2018     09/26/2018    K 3.7 09/26/2018     09/26/2018    CO2 30.0 09/ GM/118ML enema 133 mL 1 enema Rectal Once PRN   Chlorhexidine Gluconate (PERIDEX) 0.12 % solution 15 mL 15 mL Mouth/Throat [email or web address]   Dexmedetomidine HCl in NaCl (PRECEDEX) 400 MCG/100ML premix infusion 0.2-1.5 mcg/kg/hr Intravenous Continuous   norep

## 2018-09-27 NOTE — PLAN OF CARE
RESPIRATORY - ADULT    • Achieves optimal ventilation and oxygenation Not Progressing          CARDIOVASCULAR - ADULT    • Maintains optimal cardiac output and hemodynamic stability Progressing        NEUROLOGICAL - ADULT    • Achieves stable or improved n

## 2018-09-27 NOTE — PALLIATIVE CARE NOTE
[address] Follow Up      Christina Velasquez  [id number]       Patient seen and evaluated, family (two sisters) at bedside. Physical Exam:  GEN:  Remains intubated    Assessment/Recommendations:  Prognosis/Goals of Care:  Met w

## 2018-09-27 NOTE — SLP NOTE
Attempted follow up, patient remains on ventilator support and not appropriate for evaluation. Note planned hospice meeting. Will continue to follow as appropriate.     Fernanda Hui Todd 86 CCC-SLP  Pager [phone]

## 2018-09-27 NOTE — PROGRESS NOTES
[facility]  Progress Note     Delmer Strong Patient Status:  Inpatient    1960 MRN [id number]   [facility] 4SW-A Attending Dr. Kierra Irizarry Day # 6 PCP [facility]     Chief Complaint: resp failure    S: Patient awake, vent 18.3*  16.7*  16.9*   INR  1.46*  1.30*  1.32*       No results for input(s): TROP, CK in the last 168 hours. Imaging: Imaging data reviewed in Epic.     Medications:   • piperacillin-tazobactam  4.5 g Intravenous Q12H   • albuterol sulfate  2.5 mg 98.5 °F (36.9 °C) (Temporal)   Resp 25   Ht 5' 8\" (1.727 m)   Wt 145 lb 1 oz (65.8 kg)   SpO2 100%   BMI 22.06 kg/m²   Gen: Awake on vent  Card: RRR  Pulm: Diminished     A/P  1. Acute hypoxic respiratory failure  1. Vent management per pulm  2.  PSAR PNA

## 2018-09-27 NOTE — PLAN OF CARE
CARDIOVASCULAR - ADULT    • Maintains optimal cardiac output and hemodynamic stability Progressing          PAIN - ADULT    • Verbalizes/displays adequate comfort level or patient's stated pain goal Progressing          RESPIRATORY - ADULT    • Achieves op

## 2018-09-27 NOTE — PROGRESS NOTES
[facility]  Nephrology Progress Note    Criselda Chavis Attending:  Mickie Montague DO       Assessment and Plan:    1) ESRD- due to long-standing hypertension; remains volume up despite daily dialysis- repeat today    2) Hypercapneic resp failure- due 09/27/2018    HCT 25.9 09/27/2018    .0 09/27/2018    CREATSERUM 2.42 09/27/2018    BUN 14 09/27/2018     09/27/2018    K 3.7 09/27/2018     09/27/2018    CO2 30.0 09/27/2018    GLU 88 09/27/2018    CA 8.4 09/27/2018    PTT 58.1 09/27/20 Gluconate (PERIDEX) 0.12 % solution 15 mL 15 mL Mouth/Throat [email or web address]   Dexmedetomidine HCl in NaCl (PRECEDEX) 400 MCG/100ML premix infusion 0.2-1.5 mcg/kg/hr Intravenous Continuous   norepinephrine (LEVOPHED) 4 mg/250 ml premix infusion 0.5-30 mcg/mi

## 2018-09-27 NOTE — PROGRESS NOTES
Critical Care Progress Note     Assessment / Plan:  1. Acute hypercapnic and hypoxic resp failure - seems neurologically or centrally mediated at this time.  If pt receives repeated auditory stimulation he is able to breathe at a rate of 20-24 w/ Vt of 400- Exam:  General - intubated  Respiratory - bibasilar crackles  Cardiovascular - RRR, no MRG  Abdo - soft, NTND  Ext - no edema  Skin - no rashes  Mental status - interactive, follows commands    Medications:  Reviewed in EMR    Lab Data:  Reviewed in EMR

## 2018-09-27 NOTE — PLAN OF CARE
NEUROLOGICAL - ADULT    • Achieves stable or improved neurological status Progressing    • Achieves maximal functionality and self care Progressing          PAIN - ADULT    • Verbalizes/displays adequate comfort level or patient's stated pain goal Progress

## 2018-09-28 PROBLEM — C44.92 SCC (SQUAMOUS CELL CARCINOMA): Status: ACTIVE | Noted: 2018-01-01

## 2018-09-28 NOTE — DISCHARGE SUMMARY
[facility] HOSPITALIST  DISCHARGE SUMMARY     Allyssa Humphrey Patient Status:  Inpatient    1960 MRN [id number]   [facility] [place] [facility] 4SW-A Attending Ree Dewey, [address] Day # 0 [facility]     Date of Admission: 2018  Date of Dischar CVA during last hospitalization. Patient became more awake alert. He passed SLP eval. Neurology felt TME from PNA. EEG showed slowing. Psych evaluated and adjusted medications - decreased zoloft due to QT and started abilify.   On 9/18 patient deteriorat

## 2018-09-28 NOTE — PROGRESS NOTES
[facility]  Nephrology Progress Note    Dmitri Cavazos Attending:  Orlin Leon DO       Assessment and Plan:    1) ESRD on HD x 14 yrs    2) RLL PNA + squamous cell lung CA    3) Recent L UE DVT / PE with RV strain      4) Anemia- due to ESRD + GI 124 09/28/2018       Imaging: All imaging studies reviewed. Meds:     Current Facility-Administered Medications:   Albumin Human (ALBUMINAR) 25 % solution 100 mL 100 mL Intravenous PRN Dialysis   morphINE sulfate (PF) 4 MG/ML injection 2 mg 2 mg Bradford Jess Potassium Chloride ER (K-DUR M20) CR tab 20 mEq 20 mEq Oral Once   Sertraline HCl (ZOLOFT) tab 50 mg 50 mg Oral Nightly   ARIpiprazole (ABILIFY) tab 5 mg 5 mg Oral Daily   Fluticasone Furoate-Vilanterol (BREO ELLIPTA) 200-25 MCG/INH inhaler 1 puff 1 puff

## 2018-09-28 NOTE — PROGRESS NOTES
09/28/18 3307   Clinical Encounter Type   Visited With Patient not available   Continue Visiting Yes

## 2018-09-28 NOTE — SLP NOTE
Note plan to transition to comfort measures. Will sign off.     Julian Hui Todd 87 CCC-SLP  Pager [phone]

## 2018-09-28 NOTE — PROGRESS NOTES
Critical Care Progress Note        NAME: Maegan Rodriguez - ROOM: 789/137-U - MRN: [id number] - Age: 60year old - : 1960  Date of Admission: 2018  1:53 AM  Admission Diagnosis: Somnolence [R40.0]  Hypoglycemia [E16.2]  Encephalopathy acute [G93.4 hydroxide, bisacodyl, FLEET ENEMA, ondansetron HCl, Metoclopramide HCl, glucose **OR** Glucose-Vitamin C **OR** dextrose **OR** glucose **OR** Glucose-Vitamin C     Lungs: clear to auscultation bilaterally  Heart: S1, S2 normal, no murmur, click, rub or ga concentrations >100 ng/mL. It is recommended that physicians ask all patients who may be on biotin supplementation to stop biotin consumption at least 72 hours prior to collection of a new sample.      Albumin   Date/Time Value Ref Range Status   09/25/2018

## 2018-09-28 NOTE — PROGRESS NOTES
[facility]  Progress Note     Vesna Feliz Patient Status:  Inpatient    1960 MRN [id number]   [facility] 4SW-A Attending Dr. Arpita Nicole Day # 15 PCP [facility]     Chief Complaint: resp failure    S: Patient awake, vent • Sertraline HCl  50 mg Oral Nightly   • ARIpiprazole  5 mg Oral Daily   • Fluticasone Furoate-Vilanterol  1 puff Inhalation Daily       ASSESSMENT / PLAN:     1.  Acute Respiratory failure component of obstruction with malignancy found during this stay,

## 2018-09-28 NOTE — PLAN OF CARE
CARDIOVASCULAR - ADULT    • Maintains optimal cardiac output and hemodynamic stability Progressing          METABOLIC/FLUID AND ELECTROLYTES - ADULT    • Glucose maintained within prescribed range Progressing          RESPIRATORY - ADULT    • Achieves opti

## 2018-09-28 NOTE — HOSPICE RN NOTE
Admission to Hospice dx squamous cell CA. Acute resp failure currently intubated with extubation planned.   Spoke with Dr. Rashmi Artis, attending and Dr. Jeannie Bennett, Hospice Medical Director who agree with plan to admit GIP with support for extubation and

## 2018-09-29 NOTE — PLAN OF CARE
Provided report to YAMILKA Harrell, awaiting pt transport to med/onc. Pt remains resting comfortably in bed, morphine gtt running.

## 2018-09-29 NOTE — PLAN OF CARE
Upon entering room, no palpable carotid pulse, no respirations, patient was pronounced by charge nurse Nimesh Gray .

## 2018-09-29 NOTE — PLAN OF CARE
While rounding on patient, appears comfortable,respirations regular and easy, o2 on at 2 liters per nasal cannula, on continous morphine drip,eyes open.

## 2018-09-29 NOTE — PROGRESS NOTES
09/29/18 1136   Clinical Encounter Type   Visited With Family   Routine Visit Follow-up   Continue Visiting No   Crisis Visit Death   Patient's Supportive Strategies/Resources  assisted family to identify coping skils, available coping skilss, s

## 2018-09-29 NOTE — PLAN OF CARE
After several attempts ANNA power of  called unit back and is deciding if she wants an autopsy or not, will foolow up.

## 2018-09-29 NOTE — PROGRESS NOTES
Received patient transferred from ICU. Unresponsive. On 2 LNC. Respiratory easy, non- labored. Appears comfortable. On morphine gtt at 2 mg/ hr. Pt made comfortable in bed.

## 2018-09-30 NOTE — H&P
[facility]  History and Physical     Josefina Palmer Patient Status:  Inpatient    1960 MRN [id number]   [facility] 4NW-A Attending No att. providers found   Hosp Day # 1 PCP [facility]     Chief Complaint: Hospice    History Performed by Sylvester Wilde MD at [facility] ENDOSCOPY    Social History:  reports that  has never smoked. he has never used smokeless tobacco.    Family History: No family history on file.     Allergies: No Known Allergies    Medications:    No current faci Pertinent positives and negatives noted in the HPI.       Diagnostic Data:      Labs:  Recent Labs   Lab  09/24/18   0531  09/24/18   0532  09/25/18   0400  09/26/18   0452  09/26/18   0453  09/27/18   0406  09/28/18   0553   WBC   --   3.9*  3.9*  2.9*

## 2018-09-30 NOTE — DISCHARGE SUMMARY
[facility] HOSPITALIST  DISCHARGE SUMMARY     Jackelin Camarillo Patient Status:  Inpatient    1960 MRN [id number]   [facility] 4SW-A Attending No att. providers found   Hosp Day # 12 PCP [facility]     Date of Admission: 2018  Date of D agitation during last admission and family reported no history of psychosis. Recent imaging without CVA during last hospitalization. Patient became more awake alert. He passed SLP eval. Neurology felt TME from PNA. EEG showed slowing.   Psych evaluated a

## 2019-01-11 ENCOUNTER — TELEPHONE (OUTPATIENT)
Dept: NEPHROLOGY | Facility: CLINIC | Age: 59
End: 2019-01-11

## 2019-06-24 NOTE — TELEPHONE ENCOUNTER
Patient's sister called the office wanting to know what kind of cancer her brother had. Would the info she needs be in the discharge summary of 10/03? [employer]

## 2023-01-05 NOTE — PROGRESS NOTES
[facility]  Nephrology Progress Note    Amna Amato Attending:  Ignacia Galvan,        Assessment and Plan:    1) ESRD- due to long-standing hypertension [facility]; very noncompliant and cuts his dialysis HCT 26.4 09/24/2018    .0 09/24/2018    CREATSERUM 4.87 09/24/2018    BUN 30 09/24/2018     09/24/2018    K 3.7 09/24/2018     09/24/2018    CO2 29.0 09/24/2018    GLU 95 09/24/2018    CA 8.4 09/24/2018    PTT 78.1 09/24/2018    INR 1.57 mcg/min Intravenous Continuous   Heparin Lock Flush 100 UNIT/ML lock flush 150 Units 1.5 mL Intravenous Once   Pantoprazole Sodium (PROTONIX) 40 mg in Sodium Chloride 0.9 % 10 mL IV push 40 mg Intravenous Q12H   Potassium Chloride ER (K-DUR M20) CR tab 20 18-Dec-2022 08:16

## 2023-06-19 NOTE — CONSULTS
[facility]  Report of Consultation    Weston Dixon Patient Status:  Inpatient    1960 MRN [id number]   [facility] 7NE-A Attending Tamiko Jorge MD   Hosp Day # 0 PCP [facility]       Assessment / Plan:    1) ESRD- due to long-st docusate sodium (COLACE) cap 100 mg, 100 mg, Oral, BID  •  PEG 3350 (MIRALAX) powder packet 17 g, 17 g, Oral, Daily PRN  •  bisacodyl (DULCOLAX) rectal suppository 10 mg, 10 mg, Rectal, Daily PRN    No current outpatient prescriptions on file.     Review of [employer]

## 2024-02-27 NOTE — ED NOTES
Pt drowsy but arousable. Repeat accucheck 201. Pt informed on diagnosis of pneumonia and admission to hospital. States \"ok\" when told. Denies pain at present time. Antibiotics initiated. No

## (undated) DEVICE — BOWLS UTILITY 16OZ

## (undated) DEVICE — 60 ML SYRINGE REGULAR TIP: Brand: MONOJECT

## (undated) DEVICE — SYRINGE 10ML SLIP TIP

## (undated) DEVICE — 3M™ RED DOT™ MONITORING ELECTRODE WITH FOAM TAPE AND STICKY GEL, 50/BAG, 20/CASE, 72/PLT 2570: Brand: RED DOT™

## (undated) DEVICE — SINGLE USE BIOPSY VALVE MAJ-210: Brand: SINGLE USE BIOPSY VALVE (STERILE)

## (undated) DEVICE — SINGLE USE SUCTION VALVE MAJ-209: Brand: SINGLE USE SUCTION VALVE (STERILE)

## (undated) DEVICE — 1200CC GUARDIAN II: Brand: GUARDIAN

## (undated) DEVICE — MEDI-VAC SUCTION HANDLE REGULAR CAPACITY: Brand: CARDINAL HEALTH

## (undated) DEVICE — MEDI-VAC NON-CONDUCTIVE SUCTION TUBING: Brand: CARDINAL HEALTH

## (undated) DEVICE — FILTERLINE NASAL ADULT O2/CO2

## (undated) DEVICE — AIRLIFE&#8482 MISTY MAX 10 NEBULIZER W 7 (2.1 M) CRUSH RESISTANT OXYGEN TUBING BAFFLED TEE ADAPTER, MOUTHPIECE: Brand: AIRLIFE

## (undated) DEVICE — MASK ISOLATION

## (undated) DEVICE — FORCEPS BX 100CM 1.8MM RJ STD

## (undated) NOTE — LETTER
Nicole Tate [address] [address]  [place], [address] [facility]    Consent for Operation  Date: __________________                                Time: _______________    1.  I authorize the performance upon Rayma Pert the following operation:  [employer] videotape. [facility] will not be responsible for storage or maintenance of this tape. 6. For the purpose of advancing medical education, I consent to the admittance of observers to the Operating Room.     7. I authorize the use of any specimen, organs Signature of Patient:   ___________________________    When the patient is a minor or mentally incompetent to give consent:  Signature of person authorized to consent for patient: ___________________________   Relationship to patient: _____________________

## (undated) NOTE — LETTER
Patient Name: Vesna Feliz        : 1960       Medical Record #: [id number]    CONSENT FOR PROCEDURES/SEDATION    Date: 2018       Time: 3:45 PM        1.  I authorize the performance upon Vesna Feliz the following: Flexible Bronchoscopy

## (undated) NOTE — LETTER
Nicole Tate [address] [facility] [address], [address] [facility]    Consent for Operation   Date: 9/4/18                               Time: 2272    1. I authorize the performance upon Мария Manzano the following operation:  Right thoracentesis    2.  I Mariama Joseph videotape. [facility] will not be responsible for storage or maintenance of this tape. 6. For the purpose of advancing medical education, I consent to the admittance of observers to the Operating Room.     7. I authorize the use of any specimen, organs Signature of Patient:   ___________________________    When the patient is a minor or mentally incompetent to give consent:  Signature of person authorized to consent for patient: ___________________________   Relationship to patient: _____________________

## (undated) NOTE — LETTER
Nicole Tate [address] 6 [address] [address]  [place], [address] [facility]    Consent for Operation  Date: __________________                                Time: _______________    1.  I authorize the performance upon Allyssa Humphrey the following operation:  Central ve videotape. [facility] will not be responsible for storage or maintenance of this tape. 6. For the purpose of advancing medical education, I consent to the admittance of observers to the Operating Room.     7. I authorize the use of any specimen, organs Signature of Patient:   ___________________________    When the patient is a minor or mentally incompetent to give consent:  Signature of person authorized to consent for patient: ___________________________   Relationship to patient: _____________________

## (undated) NOTE — IP AVS SNAPSHOT
[address]            (For Outpatient Use Only) Initial Admit Date: 8/31/2018   Inpt/Obs Admit Date: Inpt: 8/31/18 / Obs: N/A   Discharge Date:    Carylon Kawasaki:  [de-identified]   MRN: [de-identified]   CSN: [id number]        [id number] Subscriber Name:  Tano Hollis :    Subscriber ID:  Pt Rel to Subscriber:    Hospital Account Financial Class: Medicare    2018

## (undated) NOTE — LETTER
Nicole Tate [address]  [address] [place] [address], [address] [place] [address]    Consent for Operation  Date: __________________                                Time: _______________    1.  I authorize the performance upon Jackelin Camarillo the following operation:  Right thor videotape. [facility] will not be responsible for storage or maintenance of this tape. 6. For the purpose of advancing medical education, I consent to the admittance of observers to the Operating Room.     7. I authorize the use of any specimen, organs Signature of Patient:   ___________________________    When the patient is a minor or mentally incompetent to give consent:  Signature of person authorized to consent for patient: ___________________________   Relationship to patient: _____________________

## (undated) NOTE — IP AVS SNAPSHOT
Patient Demographics     Address  [address] [place] [address]  Elayne Martinez [address] Phone  [phone] [facility]) *Preferred*  [phone] [facility])      Emergency Contact(s)     Name Relation Home Work Pinky Aviles [facility] [phone]    Trip atorvastatin 80 MG Tabs  Commonly known as:  LIPITOR  Next dose due:  9/11/18      Take 80 mg by mouth nightly.           Budesonide-Formoterol Fumarate 160-4.5 MCG/ACT Aero  Commonly known as:  SYMBICORT  Next dose due:  9/11/18  Notes to patient:  Majo Montanez sodium chloride 0.9% SOLN 100 mL with Meropenem 500 MG SOLR 500 mg  Next dose due:  9/12/18      Inject 500 mg into the vein daily for 5 days.   Stop taking on:  9/17/2018   Karie Snellen, MD         Warfarin Sodium 2 MG Tabs  Commonly known as:  COUMADIN CPAP Settings (Inpatient)       Most Recent Value   Set rate  12 breaths/min   Set IPAP  10   Set EPAP  5   O2%  45 %   I time  0.9         Lab Results Last 24 Hours      BASIC METABOLIC PANEL (8) [[id number]] (Abnormal)  Resulted: 09/11/18 0651, Result Procedure                               Abnormality         Status                     ---------                               -----------         ------                     CBC W/ DIFFERENTIAL[[id number]]          Abnormal            Final result Levofloxacin >=8  Resistant    Meropenem <=0.25  Sensitive    Tobramycin >=16  Resistant    Trimethoprim/Sulfa <=20  Sensitive                   MRSA Culture Only Once [[id number]] Collected:  09/02/18 0439    Order Status:  Completed Lab Status:  Final re Past Surgical History:[OO.1] History reviewed. No pertinent surgical history. [OO.2]    Social History:[OO.1]  reports that he has never smoked. He has never used smokeless tobacco.[OO.2]    Family History:[OO.1] No family history on file. [OO.2]    Allergie Estimated Creatinine Clearance: 11 mL/min (A) (based on SCr of 7.35 mg/dL (H)). Recent Labs   Lab  08/31/18 0329   PTP  16.7*   INR  1.30*       Recent Labs   Lab  08/31/18 0329   TROP  0.112*[OO.2]       Imaging: Imaging data reviewed in Epic. Consults - MD Consult Notes      Consults signed by Tim Vuong MD at 9/1/2018 12:45 PM     Author:  Tim Vuong MD Service:  Pulmonology Author Type:  Physician    Filed:  9/1/2018 12:45 PM Date of Service:  9/1/2018 11:57 AM Status: No Known Allergies    Social History:    Social History  Social History   Marital status:   Spouse name: N/A    Years of education: N/A  Number of children: N/A     Occupational History  None on file     Social History Main Topics   Smoking status: haloperidol 2 MG Oral Tab Take 2 mg by mouth every 4 (four) hours as needed. Disp:  Rfl:    hydrALAzine HCl 25 MG Oral Tab Take 25 mg by mouth every 8 (eight) hours.  Disp:  Rfl:    LEVOCARNITINE OR  Disp:  Rfl:    Levocarnitine 500 MG Oral Tab Take 990 mg Net            968.6 ml[JM.1]       BP (!) 76/56   Pulse 81   Temp 97.6 °F (36.4 °C) (Oral)   Resp 10   Ht 6' 2\" (1.88 m)   Wt 158 lb 14.4 oz (72.1 kg)   SpO2 98%   BMI 20.40 kg/m²[JM.3]     General Appearance:    Awake, alert but confused   Head:    Norm - on heparin gtt  - LE doppler pending.   - hematology following, defer anticoag to them. 3. Encephalopathy: seems to be improving. Etiology is unclear; uncertain what his baseline is  - have been unable to obtain abg given vasculopathy.  If able to [place] - Transthoracic Echocardiogram Name:Cem Becerra Date: 2018 :  1960 Ht:  (74in)  BP: 103 / 58 MRN:  [id number]    Age:  58years    Wt:  (158lb) HR: 65bpm Loc:  EDW        Gndr: M          BSA: 1.97m^2 Sonographer: Sue MONTGOMERY Ordering:    Elina Mccollum Transvalvular velocity was     within the normal range. There was no evidence for stenosis. There was     trivial regurgitation. 6.  Right ventricle: The cavity size was moderately to markedly increased. Wall thickness was moderately increased.  Systoli Doppler: Transvalvular velocity was within the normal range. There was no stenosis. Mild regurgitation. Tricuspid valve:   Structurally normal valve. Normal thickness leaflets. Doppler:  Transvalvular velocity was within the normal range.  There was no evid 0.056 m/sec  ---------  LV E/e', medial                         13           ---------  LV e', average                          0.06  m/sec  ---------  LV E/e', average                        12           ---------   Ventricular septum - Prepared and electronically signed by Kristine Carbajal MD 08/31/2018 12:22         Cath Angiogram Results (HF Patients only)    No exam resulted this encounter.           Physical Therapy Notes (last 72 hours) (Notes from 9/8/2018  5:36 PM through 9/11/20 ESRD (end stage renal disease) ([facility] [address]    Anemia in chronic kidney disease, on chronic dialysis (HCC)    Pulmonary embolus (HCC)    PEA (Pulseless electrical activity) (HCC)    Bradycardia    Acute deep vein thrombosis (DVT) of femoral vein of right lower Dynamic Standing: Poor    ADDITIONAL TESTS  Additional Tests: Elderly Mobility Scale     Elderly Mobility Scale: 6/20                           NEUROLOGICAL FINDINGS                      ACTIVITY TOLERANCE  O2 Saturation at rest 92% and with activity 88% max verbal and tactile cueing. Pt getting upset when therapist insisted on it for safety. Pt took approx 2 steps to get to bedside chair with min A, declined use of RW, HHA used. Pt with poor safety awareness.  Stand to sit transfer with min A, decreased co the above deficits to assist patient in returning to prior to level of function. DISCHARGE RECOMMENDATIONS  PT Discharge Recommendations: Sub-acute rehabilitation(ELOS 10-13 days)    PLAN  PT Treatment Plan: Bed mobility; Body mechanics; Endurance; Energy co hypotension .     Pt's admission complicated by acute resp failure with hypoxia and hypercapnia s/p extubated, now on 4L O2.     Therapy significant imaging (date, test, result):   CT head - no acute intracranial pathology     Therapy significant co-morbidi Fall Risk: High fall risk    WEIGHT BEARING RESTRICTION  Weight Bearing Restriction: None                PAIN ASSESSMENT  Ratin  Location: no pain  Management Techniques: Activity promotion; Body mechanics; Relaxation;Breathing techniques;Repositioning assessment due to not participating in assessment but stating there have been no changes, pt was able to complete sit to stand from low chair with max assist x2 with OT and PCT, therapist assist pt to complete t/f to bed with max assist and max cues.   Pt s OT Treatment Plan: Balance activities; Energy conservation/work simplification techniques;ADL training;IADL training;Continued evaluation; Compensatory technique education;Equipment eval/education;Patient/Family training;Patient/Family education; Endurance tr Diet Recommendations - Liquid: Thin       Aspiration Precautions: Upright position; Slow rate;Small bites and sips  Medication Administration Recommendations: No restrictions    Patient Experiencing Pain: No                Discharge Recommendations  Dischar

## (undated) NOTE — ED AVS SNAPSHOT
Maegan Rodriguez   MRN: [id number]    Department:  [facility] Emergency Department   Date of Visit:  4/8/2018           Disclosure     Insurance plans vary and the physician(s) referred by the ER may not be covered by your plan.  Please contact yo within the next three months to obtain basic health screening including reassessment of your blood pressure.     IF THERE IS ANY CHANGE OR WORSENING OF YOUR CONDITION, CALL YOUR PRIMARY CARE PHYSICIAN AT ONCE OR RETURN IMMEDIATELY TO THE EMERGENCY DEPARTMEN

## (undated) NOTE — LETTER
Nicole Tate [address] 6 [address] [address]  [place], [address] [facility]    Consent for Operation  Date: __________________                                Time: _______________    1.  I authorize the performance upon Amna Amato the following operation:  Central ve videotape. [facility] will not be responsible for storage or maintenance of this tape. 6. For the purpose of advancing medical education, I consent to the admittance of observers to the Operating Room.     7. I authorize the use of any specimen, organs Signature of Patient:   ___________________________    When the patient is a minor or mentally incompetent to give consent:  Signature of person authorized to consent for patient: ___________________________   Relationship to patient: _____________________

## (undated) NOTE — LETTER
Patient Name: Gibson Coleman        : 1960       Medical Record #: [id number]    CONSENT FOR PROCEDURES/SEDATION    Date: 2018       Time: 8:57 AM        1.  I authorize the performance upon Gibson Coleman the following: Ultrasound Guided Parac